# Patient Record
Sex: FEMALE | Race: WHITE | Employment: STUDENT | URBAN - METROPOLITAN AREA
[De-identification: names, ages, dates, MRNs, and addresses within clinical notes are randomized per-mention and may not be internally consistent; named-entity substitution may affect disease eponyms.]

---

## 2017-10-19 ENCOUNTER — HOSPITAL ENCOUNTER (INPATIENT)
Age: 22
LOS: 1 days | Discharge: HOME | DRG: 639 | End: 2017-10-20
Attending: EMERGENCY MEDICINE | Admitting: HOSPITALIST
Payer: OTHER GOVERNMENT

## 2017-10-19 ENCOUNTER — APPOINTMENT (OUTPATIENT)
Dept: GENERAL RADIOLOGY | Age: 22
DRG: 639 | End: 2017-10-19
Attending: NURSE PRACTITIONER
Payer: OTHER GOVERNMENT

## 2017-10-19 DIAGNOSIS — E08.10 DIABETIC KETOACIDOSIS WITHOUT COMA ASSOCIATED WITH DIABETES MELLITUS DUE TO UNDERLYING CONDITION (HCC): Primary | ICD-10-CM

## 2017-10-19 PROBLEM — E11.10 DKA (DIABETIC KETOACIDOSES): Status: ACTIVE | Noted: 2017-10-19

## 2017-10-19 LAB
ADMINISTERED INITIALS, ADMINIT: NORMAL
ALBUMIN SERPL-MCNC: 3.9 G/DL (ref 3.5–5)
ALBUMIN/GLOB SERPL: 0.8 {RATIO} (ref 1.1–2.2)
ALP SERPL-CCNC: 149 U/L (ref 45–117)
ALT SERPL-CCNC: 19 U/L (ref 12–78)
ANION GAP BLD CALC-SCNC: 20 MMOL/L (ref 5–15)
ANION GAP SERPL CALC-SCNC: 16 MMOL/L (ref 5–15)
ANION GAP SERPL CALC-SCNC: 19 MMOL/L (ref 5–15)
ANION GAP SERPL CALC-SCNC: ABNORMAL MMOL/L (ref 5–15)
APPEARANCE UR: ABNORMAL
ARTERIAL PATENCY WRIST A: ABNORMAL
ARTERIAL PATENCY WRIST A: YES
AST SERPL-CCNC: 11 U/L (ref 15–37)
BACTERIA URNS QL MICRO: ABNORMAL /HPF
BASOPHILS # BLD: 0 K/UL (ref 0–0.1)
BASOPHILS NFR BLD: 0 % (ref 0–1)
BDY SITE: ABNORMAL
BDY SITE: ABNORMAL
BILIRUB SERPL-MCNC: 0.4 MG/DL (ref 0.2–1)
BILIRUB UR QL: NEGATIVE
BLASTS NFR BLD MANUAL: 0 %
BUN BLD-MCNC: 14 MG/DL (ref 9–20)
BUN SERPL-MCNC: 10 MG/DL (ref 6–20)
BUN/CREAT SERPL: 10 (ref 12–20)
BUN/CREAT SERPL: 10 (ref 12–20)
BUN/CREAT SERPL: 8 (ref 12–20)
CA-I BLD-MCNC: 1.28 MMOL/L (ref 1.12–1.32)
CALCIUM SERPL-MCNC: 7.9 MG/DL (ref 8.5–10.1)
CALCIUM SERPL-MCNC: 8 MG/DL (ref 8.5–10.1)
CALCIUM SERPL-MCNC: 8.2 MG/DL (ref 8.5–10.1)
CHLORIDE BLD-SCNC: 115 MMOL/L (ref 98–107)
CHLORIDE SERPL-SCNC: 108 MMOL/L (ref 97–108)
CHLORIDE SERPL-SCNC: 112 MMOL/L (ref 97–108)
CHLORIDE SERPL-SCNC: 113 MMOL/L (ref 97–108)
CO2 BLD-SCNC: 8 MMOL/L (ref 21–32)
CO2 SERPL-SCNC: 5 MMOL/L (ref 21–32)
CO2 SERPL-SCNC: 9 MMOL/L (ref 21–32)
CO2 SERPL-SCNC: <5 MMOL/L (ref 21–32)
COLOR UR: ABNORMAL
CREAT BLD-MCNC: 0.6 MG/DL (ref 0.6–1.3)
CREAT SERPL-MCNC: 1 MG/DL (ref 0.55–1.02)
CREAT SERPL-MCNC: 1.05 MG/DL (ref 0.55–1.02)
CREAT SERPL-MCNC: 1.19 MG/DL (ref 0.55–1.02)
D DIMER PPP FEU-MCNC: 1.91 MG/L FEU (ref 0–0.65)
D50 ADMINISTERED, D50ADM: 0 ML
D50 ORDER, D50ORD: 0 ML
DIFFERENTIAL METHOD BLD: ABNORMAL
EOSINOPHIL # BLD: 0 K/UL (ref 0–0.4)
EOSINOPHIL NFR BLD: 0 % (ref 0–7)
EPITH CASTS URNS QL MICRO: ABNORMAL /LPF
ERYTHROCYTE [DISTWIDTH] IN BLOOD BY AUTOMATED COUNT: 14.7 % (ref 11.5–14.5)
EST. AVERAGE GLUCOSE BLD GHB EST-MCNC: 312 MG/DL
GAS FLOW.O2 O2 DELIVERY SYS: ABNORMAL L/MIN
GAS FLOW.O2 O2 DELIVERY SYS: ABNORMAL L/MIN
GLOBULIN SER CALC-MCNC: 4.7 G/DL (ref 2–4)
GLSCOM COMMENTS: NORMAL
GLUCOSE BLD STRIP.AUTO-MCNC: 177 MG/DL (ref 65–100)
GLUCOSE BLD STRIP.AUTO-MCNC: 184 MG/DL (ref 65–100)
GLUCOSE BLD STRIP.AUTO-MCNC: 186 MG/DL (ref 65–100)
GLUCOSE BLD STRIP.AUTO-MCNC: 193 MG/DL (ref 65–100)
GLUCOSE BLD STRIP.AUTO-MCNC: 194 MG/DL (ref 65–100)
GLUCOSE BLD STRIP.AUTO-MCNC: 237 MG/DL (ref 65–100)
GLUCOSE BLD STRIP.AUTO-MCNC: 269 MG/DL (ref 65–100)
GLUCOSE BLD STRIP.AUTO-MCNC: 277 MG/DL (ref 65–100)
GLUCOSE BLD STRIP.AUTO-MCNC: 286 MG/DL (ref 65–100)
GLUCOSE BLD-MCNC: 258 MG/DL (ref 65–100)
GLUCOSE SERPL-MCNC: 202 MG/DL (ref 65–100)
GLUCOSE SERPL-MCNC: 283 MG/DL (ref 65–100)
GLUCOSE SERPL-MCNC: 293 MG/DL (ref 65–100)
GLUCOSE UR STRIP.AUTO-MCNC: >1000 MG/DL
GLUCOSE, GLC: 177 MG/DL
GLUCOSE, GLC: 184 MG/DL
GLUCOSE, GLC: 186 MG/DL
GLUCOSE, GLC: 193 MG/DL
GLUCOSE, GLC: 194 MG/DL
GLUCOSE, GLC: 237 MG/DL
GLUCOSE, GLC: 277 MG/DL
GLUCOSE, GLC: 286 MG/DL
GRAN CASTS URNS QL MICRO: ABNORMAL /LPF
HBA1C MFR BLD: 12.5 % (ref 4.2–6.3)
HCG UR QL: NEGATIVE
HCT VFR BLD AUTO: 47.5 % (ref 35–47)
HCT VFR BLD CALC: 49 % (ref 35–47)
HGB BLD-MCNC: 16.7 GM/DL (ref 11.5–16)
HGB BLD-MCNC: 17 G/DL (ref 11.5–16)
HGB UR QL STRIP: ABNORMAL
HIGH TARGET, HITG: 250 MG/DL
INSULIN ADMINSTERED, INSADM: 2.3 UNITS/HOUR
INSULIN ADMINSTERED, INSADM: 2.5 UNITS/HOUR
INSULIN ADMINSTERED, INSADM: 2.7 UNITS/HOUR
INSULIN ADMINSTERED, INSADM: 2.7 UNITS/HOUR
INSULIN ADMINSTERED, INSADM: 5 UNITS/HOUR
INSULIN ADMINSTERED, INSADM: 6.8 UNITS/HOUR
INSULIN ADMINSTERED, INSADM: 7.1 UNITS/HOUR
INSULIN ADMINSTERED, INSADM: 8.7 UNITS/HOUR
INSULIN ORDER, INSORD: 2.3 UNITS/HOUR
INSULIN ORDER, INSORD: 2.5 UNITS/HOUR
INSULIN ORDER, INSORD: 2.7 UNITS/HOUR
INSULIN ORDER, INSORD: 2.7 UNITS/HOUR
INSULIN ORDER, INSORD: 5 UNITS/HOUR
INSULIN ORDER, INSORD: 6.8 UNITS/HOUR
INSULIN ORDER, INSORD: 7.1 UNITS/HOUR
INSULIN ORDER, INSORD: 8.7 UNITS/HOUR
KETONES UR QL STRIP.AUTO: >80 MG/DL
LEUKOCYTE ESTERASE UR QL STRIP.AUTO: ABNORMAL
LOW TARGET, LOT: 150 MG/DL
LYMPHOCYTES # BLD: 2 K/UL (ref 0.8–3.5)
LYMPHOCYTES NFR BLD: 14 % (ref 12–49)
MAGNESIUM SERPL-MCNC: 1.5 MG/DL (ref 1.6–2.4)
MAGNESIUM SERPL-MCNC: 1.7 MG/DL (ref 1.6–2.4)
MANUAL DIFFERENTIAL PERFORMED BLD QL: ABNORMAL
MCH RBC QN AUTO: 29.5 PG (ref 26–34)
MCHC RBC AUTO-ENTMCNC: 35.8 G/DL (ref 30–36.5)
MCV RBC AUTO: 82.3 FL (ref 80–99)
METAMYELOCYTES NFR BLD MANUAL: 4 %
MINUTES UNTIL NEXT BG, NBG: 60 MIN
MONOCYTES # BLD: 1.1 K/UL (ref 0–1)
MONOCYTES NFR BLD: 8 % (ref 5–13)
MULTIPLIER, MUL: 0.02
MULTIPLIER, MUL: 0.03
MULTIPLIER, MUL: 0.04
MYELOCYTES NFR BLD MANUAL: 0 %
NEUTS BAND NFR BLD MANUAL: 1 % (ref 0–6)
NEUTS SEG # BLD: 10.6 K/UL (ref 1.8–8)
NEUTS SEG NFR BLD: 73 % (ref 32–75)
NITRITE UR QL STRIP.AUTO: NEGATIVE
NRBC # BLD: 0 K/UL (ref 0–0.01)
NRBC BLD-RTO: 0 PER 100 WBC
ORDER INITIALS, ORDINIT: NORMAL
OTHER CELLS NFR BLD MANUAL: 0 %
PCO2 BLD: <17 MMHG (ref 35–45)
PCO2 BLDV: <17 MMHG (ref 41–51)
PH BLD: 6.96 [PH] (ref 7.35–7.45)
PH BLDV: 6.91 [PH] (ref 7.32–7.42)
PH UR STRIP: 5.5 [PH] (ref 5–8)
PHOSPHATE SERPL-MCNC: 1.5 MG/DL (ref 2.6–4.7)
PLATELET # BLD AUTO: 256 K/UL (ref 150–400)
PO2 BLD: 127 MMHG (ref 80–100)
PO2 BLDV: 43 MMHG (ref 25–40)
POTASSIUM BLD-SCNC: 3.8 MMOL/L (ref 3.5–5.1)
POTASSIUM SERPL-SCNC: 2.9 MMOL/L (ref 3.5–5.1)
POTASSIUM SERPL-SCNC: 3.1 MMOL/L (ref 3.5–5.1)
POTASSIUM SERPL-SCNC: 3.2 MMOL/L (ref 3.5–5.1)
PROMYELOCYTES NFR BLD MANUAL: 0 %
PROT SERPL-MCNC: 8.6 G/DL (ref 6.4–8.2)
PROT UR STRIP-MCNC: 100 MG/DL
RBC # BLD AUTO: 5.77 M/UL (ref 3.8–5.2)
RBC #/AREA URNS HPF: ABNORMAL /HPF (ref 0–5)
RBC MORPH BLD: ABNORMAL
SERVICE CMNT-IMP: ABNORMAL
SODIUM BLD-SCNC: 138 MMOL/L (ref 136–145)
SODIUM SERPL-SCNC: 134 MMOL/L (ref 136–145)
SODIUM SERPL-SCNC: 137 MMOL/L (ref 136–145)
SODIUM SERPL-SCNC: 137 MMOL/L (ref 136–145)
SP GR UR REFRACTOMETRY: 1.02 (ref 1–1.03)
SPECIMEN TYPE: ABNORMAL
SPECIMEN TYPE: ABNORMAL
TOTAL RESP. RATE, ITRR: 27
TROPONIN I SERPL-MCNC: <0.04 NG/ML
UR CULT HOLD, URHOLD: NORMAL
UROBILINOGEN UR QL STRIP.AUTO: 0.2 EU/DL (ref 0.2–1)
WBC # BLD AUTO: 14.3 K/UL (ref 3.6–11)
WBC URNS QL MICRO: ABNORMAL /HPF (ref 0–4)

## 2017-10-19 PROCEDURE — 74011250636 HC RX REV CODE- 250/636: Performed by: FAMILY MEDICINE

## 2017-10-19 PROCEDURE — 36600 WITHDRAWAL OF ARTERIAL BLOOD: CPT

## 2017-10-19 PROCEDURE — 74011000250 HC RX REV CODE- 250: Performed by: HOSPITALIST

## 2017-10-19 PROCEDURE — 84100 ASSAY OF PHOSPHORUS: CPT | Performed by: HOSPITALIST

## 2017-10-19 PROCEDURE — 96361 HYDRATE IV INFUSION ADD-ON: CPT

## 2017-10-19 PROCEDURE — 74011250637 HC RX REV CODE- 250/637: Performed by: HOSPITALIST

## 2017-10-19 PROCEDURE — 83036 HEMOGLOBIN GLYCOSYLATED A1C: CPT | Performed by: NURSE PRACTITIONER

## 2017-10-19 PROCEDURE — 83735 ASSAY OF MAGNESIUM: CPT | Performed by: HOSPITALIST

## 2017-10-19 PROCEDURE — 84484 ASSAY OF TROPONIN QUANT: CPT | Performed by: NURSE PRACTITIONER

## 2017-10-19 PROCEDURE — 82803 BLOOD GASES ANY COMBINATION: CPT

## 2017-10-19 PROCEDURE — 80048 BASIC METABOLIC PNL TOTAL CA: CPT | Performed by: HOSPITALIST

## 2017-10-19 PROCEDURE — 74011000258 HC RX REV CODE- 258: Performed by: HOSPITALIST

## 2017-10-19 PROCEDURE — 74011250636 HC RX REV CODE- 250/636: Performed by: NURSE PRACTITIONER

## 2017-10-19 PROCEDURE — 74011636637 HC RX REV CODE- 636/637: Performed by: HOSPITALIST

## 2017-10-19 PROCEDURE — 36415 COLL VENOUS BLD VENIPUNCTURE: CPT | Performed by: HOSPITALIST

## 2017-10-19 PROCEDURE — 85379 FIBRIN DEGRADATION QUANT: CPT | Performed by: NURSE PRACTITIONER

## 2017-10-19 PROCEDURE — 65610000006 HC RM INTENSIVE CARE

## 2017-10-19 PROCEDURE — 82962 GLUCOSE BLOOD TEST: CPT

## 2017-10-19 PROCEDURE — 80053 COMPREHEN METABOLIC PANEL: CPT | Performed by: NURSE PRACTITIONER

## 2017-10-19 PROCEDURE — 81025 URINE PREGNANCY TEST: CPT

## 2017-10-19 PROCEDURE — 80047 BASIC METABLC PNL IONIZED CA: CPT

## 2017-10-19 PROCEDURE — 74011250637 HC RX REV CODE- 250/637: Performed by: FAMILY MEDICINE

## 2017-10-19 PROCEDURE — 99285 EMERGENCY DEPT VISIT HI MDM: CPT

## 2017-10-19 PROCEDURE — 81001 URINALYSIS AUTO W/SCOPE: CPT | Performed by: NURSE PRACTITIONER

## 2017-10-19 PROCEDURE — 71020 XR CHEST PA LAT: CPT

## 2017-10-19 PROCEDURE — 96374 THER/PROPH/DIAG INJ IV PUSH: CPT

## 2017-10-19 PROCEDURE — 74011250636 HC RX REV CODE- 250/636: Performed by: HOSPITALIST

## 2017-10-19 PROCEDURE — 85027 COMPLETE CBC AUTOMATED: CPT | Performed by: NURSE PRACTITIONER

## 2017-10-19 RX ORDER — POTASSIUM CHLORIDE 7.45 MG/ML
10 INJECTION INTRAVENOUS
Status: COMPLETED | OUTPATIENT
Start: 2017-10-19 | End: 2017-10-20

## 2017-10-19 RX ORDER — ONDANSETRON 2 MG/ML
4 INJECTION INTRAMUSCULAR; INTRAVENOUS
Status: DISCONTINUED | OUTPATIENT
Start: 2017-10-19 | End: 2017-10-20 | Stop reason: HOSPADM

## 2017-10-19 RX ORDER — INSULIN ASPART 100 [IU]/ML
INJECTION, SOLUTION INTRAVENOUS; SUBCUTANEOUS
COMMUNITY
End: 2021-02-08 | Stop reason: SDUPTHER

## 2017-10-19 RX ORDER — SODIUM CHLORIDE 0.9 % (FLUSH) 0.9 %
5-10 SYRINGE (ML) INJECTION AS NEEDED
Status: DISCONTINUED | OUTPATIENT
Start: 2017-10-19 | End: 2017-10-20 | Stop reason: HOSPADM

## 2017-10-19 RX ORDER — MAGNESIUM SULFATE 100 %
4 CRYSTALS MISCELLANEOUS AS NEEDED
Status: DISCONTINUED | OUTPATIENT
Start: 2017-10-19 | End: 2017-10-20 | Stop reason: HOSPADM

## 2017-10-19 RX ORDER — DEXTROSE 50 % IN WATER (D50W) INTRAVENOUS SYRINGE
12.5-25 AS NEEDED
Status: DISCONTINUED | OUTPATIENT
Start: 2017-10-19 | End: 2017-10-20 | Stop reason: HOSPADM

## 2017-10-19 RX ORDER — MAGNESIUM SULFATE HEPTAHYDRATE 40 MG/ML
2 INJECTION, SOLUTION INTRAVENOUS ONCE
Status: COMPLETED | OUTPATIENT
Start: 2017-10-19 | End: 2017-10-19

## 2017-10-19 RX ORDER — KETOROLAC TROMETHAMINE 30 MG/ML
30 INJECTION, SOLUTION INTRAMUSCULAR; INTRAVENOUS
Status: COMPLETED | OUTPATIENT
Start: 2017-10-19 | End: 2017-10-19

## 2017-10-19 RX ORDER — INSULIN GLARGINE 100 [IU]/ML
40 INJECTION, SOLUTION SUBCUTANEOUS
COMMUNITY
End: 2021-02-08 | Stop reason: SDUPTHER

## 2017-10-19 RX ORDER — SODIUM CHLORIDE 0.9 % (FLUSH) 0.9 %
5-10 SYRINGE (ML) INJECTION EVERY 8 HOURS
Status: DISCONTINUED | OUTPATIENT
Start: 2017-10-19 | End: 2017-10-20 | Stop reason: HOSPADM

## 2017-10-19 RX ORDER — ACETAMINOPHEN 325 MG/1
650 TABLET ORAL
Status: DISCONTINUED | OUTPATIENT
Start: 2017-10-19 | End: 2017-10-20 | Stop reason: HOSPADM

## 2017-10-19 RX ORDER — POTASSIUM CHLORIDE 750 MG/1
40 TABLET, FILM COATED, EXTENDED RELEASE ORAL
Status: COMPLETED | OUTPATIENT
Start: 2017-10-19 | End: 2017-10-19

## 2017-10-19 RX ORDER — INSULIN LISPRO 100 [IU]/ML
INJECTION, SOLUTION INTRAVENOUS; SUBCUTANEOUS
Status: DISCONTINUED | OUTPATIENT
Start: 2017-10-19 | End: 2017-10-20

## 2017-10-19 RX ADMIN — Medication 10 ML: at 15:00

## 2017-10-19 RX ADMIN — SODIUM CHLORIDE 2.7 UNITS/HR: 900 INJECTION, SOLUTION INTRAVENOUS at 15:23

## 2017-10-19 RX ADMIN — POTASSIUM CHLORIDE 40 MEQ: 750 TABLET, FILM COATED, EXTENDED RELEASE ORAL at 16:41

## 2017-10-19 RX ADMIN — MAGNESIUM SULFATE HEPTAHYDRATE 2 G: 40 INJECTION, SOLUTION INTRAVENOUS at 22:18

## 2017-10-19 RX ADMIN — ACETAMINOPHEN 650 MG: 325 TABLET, FILM COATED ORAL at 20:52

## 2017-10-19 RX ADMIN — KETOROLAC TROMETHAMINE 30 MG: 30 INJECTION, SOLUTION INTRAMUSCULAR at 14:05

## 2017-10-19 RX ADMIN — SODIUM CHLORIDE 1000 ML: 900 INJECTION, SOLUTION INTRAVENOUS at 12:49

## 2017-10-19 RX ADMIN — POTASSIUM CHLORIDE: 2 INJECTION, SOLUTION, CONCENTRATE INTRAVENOUS at 23:59

## 2017-10-19 RX ADMIN — POTASSIUM CHLORIDE 10 MEQ: 10 INJECTION, SOLUTION INTRAVENOUS at 23:23

## 2017-10-19 RX ADMIN — POTASSIUM CHLORIDE: 2 INJECTION, SOLUTION, CONCENTRATE INTRAVENOUS at 16:33

## 2017-10-19 RX ADMIN — Medication 10 ML: at 22:27

## 2017-10-19 NOTE — DIABETES MGMT
DTC Insulin Drip   Progress Note     Recommendations/ Comments:  Patient on the Insulin drip for < 1 hour. Drip rate 2.7 units/hr. Consult received, DTC will see patient tomorrow. Patient will require basal insulin 2 hours prior to discontinuing the drip. May want to start with 75% of home dose due to patient not taking as prescribed. Chart reviewed on [facility]. Patient is 22 y.o. female with a history of Type 1 Diabetes on intensive insulin injection program (Lantus 52 units and Humalog 10-16 units before meals) at home. A1c:   Lab Results   Component Value Date/Time    Hemoglobin A1c 12.5 10/19/2017 11:58 AM         Recent Glucose Results:   Lab Results   Component Value Date/Time     (H) 10/19/2017 11:58 AM    GLUCPOC 194 (H) 10/19/2017 03:20 PM    GLUCPOC 258 (H) 10/19/2017 12:35 PM    GLUCPOC 269 (H) 10/19/2017 11:50 AM        Lab Results   Component Value Date/Time    Creatinine 1.19 10/19/2017 11:58 AM       Active Orders   Diet    DIET CLEAR LIQUID        PO intake: No data found. Currently on insulin gtt. Will continue to follow as needed. Thank you.   Champ Diaz, MS, RN, CDE

## 2017-10-19 NOTE — ED PROVIDER NOTES
HPI Comments: 23 y/o female with IDDM here with sob and hyperglycemia. She has been feeling sob for the past 5 days. She denies any chest pain or tightness. Her blood sugars have been high for her this past week, usually between 200-300s. She did not take her lantus dose last night and admits to forgetting it occasionally. She has had no fever. No v/d; She hasn't had anything to eat or drink today, not sure about intake. She reports being sleep deprived this week secondary to mid terms and staying up late to study. She has a headache since this morning. No st, neck or back pain. No abdominal pain, dizziness, lethargy. Pmh: IDDM, only hospitalized when first diagnosed  Social: vaccines utd; Student at [address] [place] [address] from [place]    Patient is a 25 y.o. female presenting with hyperglycemia. The history is provided by the patient. High Blood Sugar    Associated symptoms include headaches. Past Medical History:   Diagnosis Date    Endocrine disease     type 1 diabetes       History reviewed. No pertinent surgical history. History reviewed. No pertinent family history. Social History     Social History    Marital status: SINGLE     Spouse name: N/A    Number of children: N/A    Years of education: N/A     Occupational History    Not on file. Social History Main Topics    Smoking status: Not on file    Smokeless tobacco: Not on file    Alcohol use Not on file    Drug use: Not on file    Sexual activity: Not on file     Other Topics Concern    Not on file     Social History Narrative    No narrative on file         ALLERGIES: Review of patient's allergies indicates no known allergies. Review of Systems   Constitutional: Negative. HENT: Negative. Respiratory: Positive for shortness of breath. Cardiovascular: Negative. Gastrointestinal: Negative. Musculoskeletal: Negative. Skin: Negative. Neurological: Positive for headaches.    All other systems reviewed and are negative. Vitals:    10/19/17 1142   BP: (!) 107/94   Pulse: (!) 130   Resp: 28   Temp: 97.5 °F (36.4 °C)   SpO2: 100%   Weight: 79.2 kg (174 lb 9.7 oz)            Physical Exam   Constitutional: She is oriented to person, place, and time. She appears well-developed. She appears ill. She appears distressed. HENT:   Mouth/Throat: Mucous membranes are dry. Mm dry, cracked lips   Eyes: Conjunctivae are normal. Pupils are equal, round, and reactive to light. Neck: Normal range of motion. Neck supple. Cardiovascular: Normal pulses. Tachycardia present. Pulmonary/Chest: Breath sounds normal. Accessory muscle usage present. Tachypnea noted. She has no decreased breath sounds. Kussmaul breathing, increased wob on exam; lungs cta   Abdominal: Soft. Bowel sounds are normal. She exhibits no distension. There is no tenderness. Musculoskeletal: Normal range of motion. Neurological: She is alert and oriented to person, place, and time. Skin: Skin is warm and dry. There is pallor. Pale, mottled skin   Nursing note and vitals reviewed.        MDM  Number of Diagnoses or Management Options  Diabetic ketoacidosis without coma associated with diabetes mellitus due to underlying condition [facility]):   Diagnosis management comments: 21 y/o known iddm here with hyperglycemia and sob; + Kussmaul breathing on exam, no cp, tightness; appears ill  Plan-- ivf, labs, possible ct chest to r/o PE       Amount and/or Complexity of Data Reviewed  Clinical lab tests: ordered and reviewed  Tests in the radiology section of CPT®: ordered and reviewed  Obtain history from someone other than the patient: yes  Discuss the patient with other providers: yes (Adult hospitalist)    Risk of Complications, Morbidity, and/or Mortality  Presenting problems: high  Diagnostic procedures: moderate  Management options: moderate    Patient Progress  Patient progress: stable    ED Course       Procedures                       12:40: Hospitalist consult: I spoke with Dr. Farrukh Valdez about patient's h and p, vbg and chem panel results; He would like a call back when cbc and other labs come back and will admit patient. Dr. Mims Back came by to see patient, I informed him of patient's d dimer result and concern for PE and I ordered cta chest. He feels she does not need the cts chest at this time and canceled test. Patient will be admitted to adult ICU.

## 2017-10-19 NOTE — PROGRESS NOTES
Intensivist / Pulmonary / Critical Care  Assessment / Plan:    23 yo with Type 1 DM admitted to ICU for treatment of DKA    1. DKA - poor baseline Diabetes control with Hgb A1C > 12. Severe metabolic acidosis with pH 6.9 and hypokalemia  2. Hypokalemia in setting of severe acidosis and DKA is concerning  3. Volume depletion secondary to 1  4. RADHA    --IVF (has been started on bicarb and K containing fluids  --insulin gtt protocol with frequent check of electrolytes  --will need aggressive K replacement  --check for Mg depletion as well  --DTC eval  --Management per hospitalist team    Will be available to see formally if needed      History / Subjective:  Hospital Day: 1 - Interval history and notes reviewed     23 yo with type 1 DM presented with sob for 5 days. BS have been in the 200-300's per patient and had missed some insulin. Chemistries / urinalysis and abg consistent with DKA    Objective:  Patient Vitals for the past 4 hrs:   BP Temp Pulse Resp SpO2 Weight   10/19/17 1530 125/84 - 99 17 98 % -   10/19/17 1515 133/87 - (!) 102 16 100 % -   10/19/17 1500 126/81 - (!) 101 15 100 % -   10/19/17 1455 117/83 98 °F (36.7 °C) (!) 108 18 100 % 80 kg (176 lb 5.9 oz)   10/19/17 1400 - - (!) 101 22 99 % -   10/19/17 1315 - - (!) 111 21 99 % -   10/19/17 1230 - - (!) 118 19 100 % -   10/19/17 1200 (!) 125/99 - (!) 115 20 100 % -   Temp (24hrs), Av.8 °F (36.6 °C), Min:97.5 °F (36.4 °C), Max:98 °F (36.7 °C)  No intake or output data in the 24 hours ending 10/19/17 1556  Lab Results   Component Value Date/Time    Glucose (POC) 194 10/19/2017 03:20 PM    Glucose (POC) 258 10/19/2017 12:35 PM    Glucose (POC) 269 10/19/2017 11:50 AM     Exam:  MM dry  No accessory use  Moves all extremities    Data:   Interval lab and diagnostic data was reviewed. Interval radiology images were independently viewed and available reports were reviewed.      CXR - clear    Lab:  Recent Labs      10/19/17   1158   WBC  14.3*   HGB 17.0*   PLT  256   NA  134*   K  3.2*   CL  108   CO2  <5*   BUN  10   CREA  1.19*   GLU  283*   CA  8.2*   TROIQ  <0.04   TBILI  0.4   SGOT  11*     ABG:  Recent Labs      10/19/17   1437   PHI  6.963*   PCO2I  <17.0*   PO2I  127*          Leo Powers MD

## 2017-10-19 NOTE — H&P
[address]   [address], [address]   HISTORY AND PHYSICAL       Name:  Lesly Coleman   MR#:  [id number]   :  1995   Account #:  [de-identified]        Date of Adm:  10/19/2017       ADMITTING ATTENDING: Key Gan MD.    PRIMARY CARE PHYSICIAN: Dr. Sienna Blair. PRIMARY ENDOCRINOLOGIST: Dr. Jad Dixon. CHIEF COMPLAINT: Shortness of breath. HISTORY OF PRESENT ILLNESS: This is a 52-year-old female with a   past medical history of diabetes mellitus type 1 that she was diagnosed   at the age of 13, 5 years ago. She comes over here because of   shortness of breath that started last , that is about 4 days ago. She claims that before she was perfectly at her baseline. She    started having some shortness of breath which progressively got worse   to the point that she claims that she would have shortness of breath   even with minimal exertion and she claimed that it would also get   worse on lying down. No chest pain. No nausea, vomiting. No nasal   congestion, no diarrhea. No urinary symptoms. She claims that she   does have a headache as well as dizziness. On specifically asking   about headaches, she mentioned that last night she was having \"a   pounding headache,\" but at the time that I was talking to her, she was   having only a mild headache. On specifically asking, the patient admits that since last few days she   has not been taking her Lantus as she is supposed to take (at home   patient is supposed to take Lantus 52 units along with NovoLog based   on her blood sugars). She claims that the reason she was not taking   her medications regularly is because she is studying till late and   sometimes she forgets taking her insulin. REVIEW OF SYSTEMS   Pertinent positives as above. The rest of the review of system were   reviewed but were all negative except for above. PAST MEDICAL HISTORY: Diabetes mellitus type 1.     PAST SURGICAL HISTORY: None. SOCIAL HISTORY: Nonsmoker, nonalcoholic, nondrug abuser. ALLERGIES: NO KNOWN DRUG ALLERGIES. HOME MEDICATIONS: The patient is on the following medications:    1. Lantus 52 units subcutaneously at bedtime. 2. NovoLog 10-15 units subcutaneously before breakfast, lunch and   dinner. FAMILY HISTORY: Not significant for coronary artery disease. Her   father has high blood pressure. PHYSICAL EXAMINATION   VITAL SIGNS: At the time when the patient was seen, the patient's   vitals were as follows: Blood pressure 107/94, pulse 130, respiratory   rate 28, saturating 100% on room air. GENERAL: Not in distress, comfortably sitting on bed. HEAD: Normocephalic, atraumatic. EYES: PERRLA, EOMI. EARS: Bilateral hearing normal, no growth. NOSE: No polyps, no bleeding. MOUTH: Dry mucous membranes. Decent oral hygiene. NECK: Supple, no JVD, no thyromegaly. RESPIRATORY: Clear to auscultation bilaterally. No adventitious   breath sounds. CARDIOVASCULAR: S1, S2 normal. No murmurs, rubs, or gallops. GASTROINTESTINAL: Bowel sounds present. Soft, nontender,   nondistended. NEUROLOGICAL: Cranial nerves 2 through 12 intact. Motor 5/5   bilaterally, upper limbs and lower limbs. Sensory normal.   PSYCHIATRIC: Mood and affect appropriate. Alert, awake, oriented   x3. LABORATORY AND RADIOLOGICAL RESULTS: WBC 14.3,   hemoglobin 17.0, hematocrit 47, and a platelet count of 126. The   patient's D-dimer is 1.9. Sodium 134, potassium 3.2, chloride 108,   bicarbonate is less than 5, BUN 10, creatinine 1.1. Troponin x1 is   negative. Hemoglobin A1c is 12.5. VBG that was done shows a pH of   6.9, pCO2 less than 17. Chest x-ray does not show any acute   abnormalities.     ASSESSMENT AND PLAN: This is a 70-year-old  female   with a past medical history of diabetes mellitus type 1, who comes over   here because of shortness of breath since last 4 days and is found to   be in diabetic ketoacidosis. 1. Diabetic ketoacidosis in a patient with history of diabetes mellitus   type 1. We will admit the patient in ICU. We will put the patient on   diabetic ketoacidosis protocol. I will also put the patient on D5 half-  normal saline with bicarbonate. We will get serial BMP and based on   that we might have to change the fluids. I wanted to get an ABG on the patient, but currently the patient is   refusing. We might have to get it later. 2. Shortness of breath with tachycardia. I think it is second to diabetic   ketoacidosis. I spoke to the patient as well as the patient's family   members and told them that I doubt that the patient has a PE and we   do not need to get a CTA for now. If even after resolving her DKA, if   she has similar symptoms, then we might readdress that, but I doubt   we would have to do. 3. Leukocytosis. I think it is likely reactive. I do not see any evidence of   any infection anywhere. The patient does not have any urinary   symptoms. Her chest x-ray is fine and her lung examination is fine as   well. I would not put the patient on any antibiotic for now. 4. Hypokalemia. Replace as needed. 5. Metabolic acidosis, likely secondary to diabetic ketoacidosis. Management as above. 6. Probable UTI. The patient does not have UTI    I have spoke to the patient as well as the patient's mom and explained   the plan, they agree with the plan. I spent about 50 minutes in taking care of the patient.         Lucia Cain MD      PG / Mike Manrique   D:  10/19/2017   14:12   T:  10/19/2017   14:58   Job #:  [id number]

## 2017-10-19 NOTE — ED NOTES
TRANSFER - OUT REPORT:    Verbal report given to Pat(name) on Senia Varma  being transferred to ICU 17(unit) for routine progression of care       Report consisted of patients Situation, Background, Assessment and   Recommendations(SBAR). Information from the following report(s) SBAR, ED Summary and MAR was reviewed with the receiving nurse. Lines:   Peripheral IV 10/19/17 Left Hand (Active)       Peripheral IV 10/19/17 Right Antecubital (Active)        Opportunity for questions and clarification was provided.       Patient transported with:   Monitor and nurse

## 2017-10-19 NOTE — IP AVS SNAPSHOT
[address] 
[phone] Patient: Ever Echevarria MRN: [id number] :1995 You are allergic to the following No active allergies Immunizations Administered for This Admission Name Date Influenza Vaccine (Quad) PF  Deferred () Recent Documentation Weight Smoking Status 85.3 kg Never Assessed Emergency Contacts Name Discharge Info Relation Home Work Mobile [address] [facility]  Parent [1] [phone] About your hospitalization You were admitted on:  2017 You last received care in the:  [facility] You were discharged on:  2017 Unit phone number:  [phone] Why you were hospitalized Your primary diagnosis was:  Not on File Your diagnoses also included:  Dka (Diabetic Ketoacidoses) ([facility]) Providers Seen During Your Hospitalizations Provider Role Specialty Primary office phone Rae Contreras MD Attending Provider Emergency Medicine [phone] Ana María Rhodes MD Attending Provider Internal Medicine [phone] Saundra Dolan MD Attending Provider Hospitalist [phone] Your Primary Care Physician (PCP) Primary Care Physician Office Phone Office Fax Mehran Mastersramesh [phone] Follow-up Information Follow up With Details Comments Contact Info Rd Saunders MD Schedule an appointment as soon as possible for a visit in 1 week for hospital discharge follow-up [address] [facility] [address] [facility] 
[phone] Current Discharge Medication List  
  
CONTINUE these medications which have NOT CHANGED Dose & Instructions Dispensing Information Comments Morning Noon Evening Bedtime LANTUS 100 unit/mL injection Generic drug:  insulin glargine Your last dose was: Your next dose is:    
   
   
 Dose:  52 Units 52 Units by SubCUTAneous route nightly. Refills:  0 NovoLOG 100 unit/mL injection Generic drug:  insulin aspart Your last dose was: Your next dose is:    
   
   
 Dose:  10-16 Units 10-16 Units by SubCUTAneous route Before breakfast, lunch, and dinner. Refills:  0 Discharge Instructions Discharge Instructions PATIENT ID: Anu Andrews MRN: [id number] YOB: 1995 DATE OF ADMISSION: 10/19/2017 11:35 AM   
DATE OF DISCHARGE: 10/20/2017 PRIMARY CARE PROVIDER: Armaan Martinez MD  
 
ATTENDING PHYSICIAN: Mely Walter MD 
DISCHARGING PROVIDER: Mely Walter MD   
To contact this individual call [phone] and ask the  to page. If unavailable ask to be transferred the Adult Hospitalist Department. DISCHARGE DIAGNOSES diabetic ketoacidosis (DKA) CONSULTATIONS: None PROCEDURES/SURGERIES: * No surgery found * PENDING TEST RESULTS:  
At the time of discharge the following test results are still pending: n/a FOLLOW UP APPOINTMENTS:  
Follow-up Information Follow up With Details Comments Contact Info Armaan Martinez MD Schedule an appointment as soon as possible for a visit in 1 week for hospital discharge follow-up [address] [facility] 
[phone] ADDITIONAL CARE RECOMMENDATIONS: you were admitted with DKA which has resolved. There is a possibility that this recurs like we discussed. Check your blood sugars frequently and if you note that they are persistently high (>300), call your doctor. If you develop symptoms of DKA such as abdominal pain, nausea and vomiting, trouble breathing, excessive fatigue, then you should return to the hospital for evaluation. Most importantly, you should NEVER skip your insulin.  Resume your home Lantus dose around 11 PM tonight. DIET: Diabetic Diet, no alcohol for the next week ACTIVITY: Activity as tolerated WOUND CARE: n/a 
 
EQUIPMENT needed: n/a DISCHARGE MEDICATIONS: 
 See Medication Reconciliation Form · It is important that you take the medication exactly as they are prescribed. · Keep your medication in the bottles provided by the pharmacist and keep a list of the medication names, dosages, and times to be taken in your wallet. · Do not take other medications without consulting your doctor. NOTIFY YOUR PHYSICIAN FOR ANY OF THE FOLLOWING:  
Fever over 101 degrees for 24 hours. Chest pain, shortness of breath, fever, chills, nausea, vomiting, diarrhea, change in mentation, falling, weakness, bleeding. Severe pain or pain not relieved by medications. Or, any other signs or symptoms that you may have questions about. DISPOSITION: 
x  Home With: 
 OT  PT  [facility]  RN  
  
 SNF/Inpatient Rehab/LTAC Independent/assisted living Hospice Other: CDMP Checked:  
Yes x PROBLEM LIST Updated: 
Yes x Signed:  
Jimy Ring MD 
10/20/2017 
4:46 PM 
 
 
Discharge Orders None [employer] Announcement We are excited to announce that we are making your provider's discharge notes available to you in [employer]. You will see these notes when they are completed and signed by the physician that discharged you from your recent hospital stay. If you have any questions or concerns about any information you see in [employer], please call the Health Information Department where you were seen or reach out to your Primary Care Provider for more information about your plan of care. Introducing [facility]! Keaton Pak introduces [employer] patient portal. Now you can access parts of your medical record, email your doctor's office, and request medication refills online. 1. In your internet browser, go to [email or web address] 2. Click on the First Time User? Click Here link in the Sign In box. You will see the New Member Sign Up page. 3. Enter your [employer] Access Code exactly as it appears below. You will not need to use this code after youve completed the sign-up process. If you do not sign up before the expiration date, you must request a new code. · [employer] Access Code: [email or web address] Expires: 1/18/2018  5:16 PM 
 
4. Enter the last four digits of your Social Security Number (xxxx) and Date of Birth (mm/dd/yyyy) as indicated and click Submit. You will be taken to the next sign-up page. 5. Create a Sweepery ID. This will be your Sweepery login ID and cannot be changed, so think of one that is secure and easy to remember. 6. Create a [employer]y password. You can change your password at any time. 7. Enter your Password Reset Question and Answer. This can be used at a later time if you forget your password. 8. Enter your e-mail address. You will receive e-mail notification when new information is available in [address] 9. Click Sign Up. You can now view and download portions of your medical record. 10. Click the Download Summary menu link to download a portable copy of your medical information. If you have questions, please visit the Frequently Asked Questions section of the [employer] website. Remember, [employer]y is NOT to be used for urgent needs. For medical emergencies, dial 911. Now available from your iPhone and Android! General Information Please provide this summary of care documentation to your next provider. Patient Signature:  ____________________________________________________________ Date:  ____________________________________________________________  
  
[facility] Signature:  ____________________________________________________________ Date:  ____________________________________________________________

## 2017-10-19 NOTE — PROGRESS NOTES
Admission Medication Reconciliation:    Information obtained from: patient     Significant PMH/Disease States:   Past Medical History:   Diagnosis Date    Endocrine disease     type 1 diabetes       Chief Complaint for this Admission:  DKA    Allergies:  Review of patient's allergies indicates no known allergies. Prior to Admission Medications:   Prior to Admission Medications   Prescriptions Last Dose Informant Patient Reported? Taking?   insulin aspart (NOVOLOG) 100 unit/mL injection 10/19/2017 at am  Yes Yes   Sig: 10-16 Units by SubCUTAneous route Before breakfast, lunch, and dinner. insulin glargine (LANTUS) 100 unit/mL injection 10/17/2017 at hs  Yes Yes   Si Units by SubCUTAneous route nightly. Facility-Administered Medications: None         Comments/Recommendations: History updated per patient. She was out of breath, but still able to provide a reliable medication history. No updates needed to inpatient orders.

## 2017-10-19 NOTE — ED TRIAGE NOTES
Arrived via EMS. History of type 1 diabetes. Reports shortness of breath since Sunday. Blood sugars have been elevated between 200 and 300.  by EMS. Denies any vomiting.

## 2017-10-19 NOTE — PROGRESS NOTES
Spoke to Johnella Opitz on phone and heard about the patient. Awaiting for labs including CBC, CMP and ABG before admitting the patient. Johnella Opitz kindly agreed to call me about the lab results.

## 2017-10-20 VITALS
DIASTOLIC BLOOD PRESSURE: 67 MMHG | SYSTOLIC BLOOD PRESSURE: 111 MMHG | OXYGEN SATURATION: 100 % | WEIGHT: 188.05 LBS | TEMPERATURE: 98.3 F | HEART RATE: 108 BPM | RESPIRATION RATE: 17 BRPM

## 2017-10-20 PROBLEM — E11.10 DKA (DIABETIC KETOACIDOSES): Status: RESOLVED | Noted: 2017-10-19 | Resolved: 2017-10-20

## 2017-10-20 LAB
ADMINISTERED INITIALS, ADMINIT: NORMAL
ANION GAP SERPL CALC-SCNC: 12 MMOL/L (ref 5–15)
ANION GAP SERPL CALC-SCNC: 13 MMOL/L (ref 5–15)
ANION GAP SERPL CALC-SCNC: 15 MMOL/L (ref 5–15)
ANION GAP SERPL CALC-SCNC: 9 MMOL/L (ref 5–15)
BASOPHILS # BLD: 0 K/UL (ref 0–0.1)
BASOPHILS NFR BLD: 0 % (ref 0–1)
BUN SERPL-MCNC: 10 MG/DL (ref 6–20)
BUN SERPL-MCNC: 8 MG/DL (ref 6–20)
BUN SERPL-MCNC: 8 MG/DL (ref 6–20)
BUN SERPL-MCNC: 9 MG/DL (ref 6–20)
BUN/CREAT SERPL: 11 (ref 12–20)
BUN/CREAT SERPL: 8 (ref 12–20)
BUN/CREAT SERPL: 8 (ref 12–20)
BUN/CREAT SERPL: 9 (ref 12–20)
CALCIUM SERPL-MCNC: 7.8 MG/DL (ref 8.5–10.1)
CALCIUM SERPL-MCNC: 7.9 MG/DL (ref 8.5–10.1)
CALCIUM SERPL-MCNC: 8.3 MG/DL (ref 8.5–10.1)
CALCIUM SERPL-MCNC: 8.4 MG/DL (ref 8.5–10.1)
CHLORIDE SERPL-SCNC: 111 MMOL/L (ref 97–108)
CHLORIDE SERPL-SCNC: 113 MMOL/L (ref 97–108)
CHLORIDE SERPL-SCNC: 113 MMOL/L (ref 97–108)
CHLORIDE SERPL-SCNC: 115 MMOL/L (ref 97–108)
CO2 SERPL-SCNC: 10 MMOL/L (ref 21–32)
CO2 SERPL-SCNC: 11 MMOL/L (ref 21–32)
CO2 SERPL-SCNC: 15 MMOL/L (ref 21–32)
CO2 SERPL-SCNC: 18 MMOL/L (ref 21–32)
CREAT SERPL-MCNC: 0.89 MG/DL (ref 0.55–1.02)
CREAT SERPL-MCNC: 0.99 MG/DL (ref 0.55–1.02)
CREAT SERPL-MCNC: 1 MG/DL (ref 0.55–1.02)
CREAT SERPL-MCNC: 1.01 MG/DL (ref 0.55–1.02)
D50 ADMINISTERED, D50ADM: 0 ML
D50 ORDER, D50ORD: 0 ML
EOSINOPHIL # BLD: 0 K/UL (ref 0–0.4)
EOSINOPHIL NFR BLD: 0 % (ref 0–7)
ERYTHROCYTE [DISTWIDTH] IN BLOOD BY AUTOMATED COUNT: 14.5 % (ref 11.5–14.5)
EST. AVERAGE GLUCOSE BLD GHB EST-MCNC: 321 MG/DL
GLSCOM COMMENTS: NORMAL
GLUCOSE BLD STRIP.AUTO-MCNC: 138 MG/DL (ref 65–100)
GLUCOSE BLD STRIP.AUTO-MCNC: 139 MG/DL (ref 65–100)
GLUCOSE BLD STRIP.AUTO-MCNC: 143 MG/DL (ref 65–100)
GLUCOSE BLD STRIP.AUTO-MCNC: 154 MG/DL (ref 65–100)
GLUCOSE BLD STRIP.AUTO-MCNC: 164 MG/DL (ref 65–100)
GLUCOSE BLD STRIP.AUTO-MCNC: 168 MG/DL (ref 65–100)
GLUCOSE BLD STRIP.AUTO-MCNC: 179 MG/DL (ref 65–100)
GLUCOSE BLD STRIP.AUTO-MCNC: 205 MG/DL (ref 65–100)
GLUCOSE BLD STRIP.AUTO-MCNC: 217 MG/DL (ref 65–100)
GLUCOSE BLD STRIP.AUTO-MCNC: 221 MG/DL (ref 65–100)
GLUCOSE BLD STRIP.AUTO-MCNC: 239 MG/DL (ref 65–100)
GLUCOSE BLD STRIP.AUTO-MCNC: 264 MG/DL (ref 65–100)
GLUCOSE BLD STRIP.AUTO-MCNC: 282 MG/DL (ref 65–100)
GLUCOSE BLD STRIP.AUTO-MCNC: 341 MG/DL (ref 65–100)
GLUCOSE SERPL-MCNC: 151 MG/DL (ref 65–100)
GLUCOSE SERPL-MCNC: 155 MG/DL (ref 65–100)
GLUCOSE SERPL-MCNC: 181 MG/DL (ref 65–100)
GLUCOSE SERPL-MCNC: 318 MG/DL (ref 65–100)
GLUCOSE, GLC: 138 MG/DL
GLUCOSE, GLC: 139 MG/DL
GLUCOSE, GLC: 143 MG/DL
GLUCOSE, GLC: 154 MG/DL
GLUCOSE, GLC: 164 MG/DL
GLUCOSE, GLC: 168 MG/DL
GLUCOSE, GLC: 179 MG/DL
GLUCOSE, GLC: 205 MG/DL
GLUCOSE, GLC: 217 MG/DL
GLUCOSE, GLC: 221 MG/DL
GLUCOSE, GLC: 239 MG/DL
GLUCOSE, GLC: 264 MG/DL
GLUCOSE, GLC: 282 MG/DL
GLUCOSE, GLC: 341 MG/DL
HBA1C MFR BLD: 12.8 % (ref 4.2–6.3)
HCT VFR BLD AUTO: 37.6 % (ref 35–47)
HGB BLD-MCNC: 13.7 G/DL (ref 11.5–16)
HIGH TARGET, HITG: 250 MG/DL
INSULIN ADMINSTERED, INSADM: 0.8 UNITS/HOUR
INSULIN ADMINSTERED, INSADM: 1 UNITS/HOUR
INSULIN ADMINSTERED, INSADM: 1.2 UNITS/HOUR
INSULIN ADMINSTERED, INSADM: 1.5 UNITS/HOUR
INSULIN ADMINSTERED, INSADM: 1.6 UNITS/HOUR
INSULIN ADMINSTERED, INSADM: 1.7 UNITS/HOUR
INSULIN ADMINSTERED, INSADM: 2.3 UNITS/HOUR
INSULIN ADMINSTERED, INSADM: 3.8 UNITS/HOUR
INSULIN ADMINSTERED, INSADM: 4.1 UNITS/HOUR
INSULIN ADMINSTERED, INSADM: 4.3 UNITS/HOUR
INSULIN ADMINSTERED, INSADM: 5.4 UNITS/HOUR
INSULIN ADMINSTERED, INSADM: 6.4 UNITS/HOUR
INSULIN ADMINSTERED, INSADM: 8.4 UNITS/HOUR
INSULIN ADMINSTERED, INSADM: 8.9 UNITS/HOUR
INSULIN ORDER, INSORD: 0.8 UNITS/HOUR
INSULIN ORDER, INSORD: 1 UNITS/HOUR
INSULIN ORDER, INSORD: 1.2 UNITS/HOUR
INSULIN ORDER, INSORD: 1.5 UNITS/HOUR
INSULIN ORDER, INSORD: 1.6 UNITS/HOUR
INSULIN ORDER, INSORD: 1.7 UNITS/HOUR
INSULIN ORDER, INSORD: 2.3 UNITS/HOUR
INSULIN ORDER, INSORD: 3.8 UNITS/HOUR
INSULIN ORDER, INSORD: 4.1 UNITS/HOUR
INSULIN ORDER, INSORD: 4.3 UNITS/HOUR
INSULIN ORDER, INSORD: 5.4 UNITS/HOUR
INSULIN ORDER, INSORD: 6.4 UNITS/HOUR
INSULIN ORDER, INSORD: 8.4 UNITS/HOUR
INSULIN ORDER, INSORD: 8.9 UNITS/HOUR
LOW TARGET, LOT: 150 MG/DL
LYMPHOCYTES # BLD: 1.1 K/UL (ref 0.8–3.5)
LYMPHOCYTES NFR BLD: 14 % (ref 12–49)
MAGNESIUM SERPL-MCNC: 2 MG/DL (ref 1.6–2.4)
MAGNESIUM SERPL-MCNC: 2.2 MG/DL (ref 1.6–2.4)
MCH RBC QN AUTO: 28.6 PG (ref 26–34)
MCHC RBC AUTO-ENTMCNC: 36.4 G/DL (ref 30–36.5)
MCV RBC AUTO: 78.5 FL (ref 80–99)
MINUTES UNTIL NEXT BG, NBG: 60 MIN
MONOCYTES # BLD: 1 K/UL (ref 0–1)
MONOCYTES NFR BLD: 13 % (ref 5–13)
MULTIPLIER, MUL: 0.01
MULTIPLIER, MUL: 0.02
MULTIPLIER, MUL: 0.02
MULTIPLIER, MUL: 0.03
MULTIPLIER, MUL: 0.04
NEUTS SEG # BLD: 5.7 K/UL (ref 1.8–8)
NEUTS SEG NFR BLD: 73 % (ref 32–75)
ORDER INITIALS, ORDINIT: NORMAL
PLATELET # BLD AUTO: 180 K/UL (ref 150–400)
POTASSIUM SERPL-SCNC: 2.9 MMOL/L (ref 3.5–5.1)
POTASSIUM SERPL-SCNC: 3 MMOL/L (ref 3.5–5.1)
POTASSIUM SERPL-SCNC: 3 MMOL/L (ref 3.5–5.1)
POTASSIUM SERPL-SCNC: 3.4 MMOL/L (ref 3.5–5.1)
RBC # BLD AUTO: 4.79 M/UL (ref 3.8–5.2)
SERVICE CMNT-IMP: ABNORMAL
SODIUM SERPL-SCNC: 138 MMOL/L (ref 136–145)
SODIUM SERPL-SCNC: 138 MMOL/L (ref 136–145)
SODIUM SERPL-SCNC: 139 MMOL/L (ref 136–145)
SODIUM SERPL-SCNC: 140 MMOL/L (ref 136–145)
WBC # BLD AUTO: 7.9 K/UL (ref 3.6–11)

## 2017-10-20 PROCEDURE — 83036 HEMOGLOBIN GLYCOSYLATED A1C: CPT | Performed by: HOSPITALIST

## 2017-10-20 PROCEDURE — 74011000250 HC RX REV CODE- 250: Performed by: HOSPITALIST

## 2017-10-20 PROCEDURE — 80048 BASIC METABOLIC PNL TOTAL CA: CPT | Performed by: HOSPITALIST

## 2017-10-20 PROCEDURE — 74011000258 HC RX REV CODE- 258: Performed by: HOSPITALIST

## 2017-10-20 PROCEDURE — 36415 COLL VENOUS BLD VENIPUNCTURE: CPT | Performed by: HOSPITALIST

## 2017-10-20 PROCEDURE — 85025 COMPLETE CBC W/AUTO DIFF WBC: CPT | Performed by: HOSPITALIST

## 2017-10-20 PROCEDURE — 74011250636 HC RX REV CODE- 250/636: Performed by: HOSPITALIST

## 2017-10-20 PROCEDURE — 83735 ASSAY OF MAGNESIUM: CPT | Performed by: HOSPITALIST

## 2017-10-20 PROCEDURE — 82962 GLUCOSE BLOOD TEST: CPT

## 2017-10-20 PROCEDURE — 83735 ASSAY OF MAGNESIUM: CPT | Performed by: FAMILY MEDICINE

## 2017-10-20 PROCEDURE — 80048 BASIC METABOLIC PNL TOTAL CA: CPT | Performed by: FAMILY MEDICINE

## 2017-10-20 PROCEDURE — 74011250637 HC RX REV CODE- 250/637: Performed by: FAMILY MEDICINE

## 2017-10-20 PROCEDURE — 74011250636 HC RX REV CODE- 250/636: Performed by: FAMILY MEDICINE

## 2017-10-20 PROCEDURE — 74011636637 HC RX REV CODE- 636/637: Performed by: HOSPITALIST

## 2017-10-20 PROCEDURE — 74011250637 HC RX REV CODE- 250/637: Performed by: HOSPITALIST

## 2017-10-20 RX ORDER — INSULIN LISPRO 100 [IU]/ML
INJECTION, SOLUTION INTRAVENOUS; SUBCUTANEOUS
Status: DISCONTINUED | OUTPATIENT
Start: 2017-10-20 | End: 2017-10-20 | Stop reason: HOSPADM

## 2017-10-20 RX ORDER — POTASSIUM CHLORIDE 20MEQ/15ML
40 LIQUID (ML) ORAL
Status: COMPLETED | OUTPATIENT
Start: 2017-10-20 | End: 2017-10-20

## 2017-10-20 RX ORDER — POTASSIUM CHLORIDE 750 MG/1
40 TABLET, FILM COATED, EXTENDED RELEASE ORAL
Status: COMPLETED | OUTPATIENT
Start: 2017-10-20 | End: 2017-10-20

## 2017-10-20 RX ORDER — POTASSIUM CHLORIDE 7.45 MG/ML
10 INJECTION INTRAVENOUS
Status: COMPLETED | OUTPATIENT
Start: 2017-10-20 | End: 2017-10-20

## 2017-10-20 RX ADMIN — POTASSIUM CHLORIDE 10 MEQ: 10 INJECTION, SOLUTION INTRAVENOUS at 01:26

## 2017-10-20 RX ADMIN — SODIUM CHLORIDE 4.1 UNITS/HR: 900 INJECTION, SOLUTION INTRAVENOUS at 10:09

## 2017-10-20 RX ADMIN — POTASSIUM CHLORIDE 10 MEQ: 10 INJECTION, SOLUTION INTRAVENOUS at 02:38

## 2017-10-20 RX ADMIN — POTASSIUM CHLORIDE 10 MEQ: 7.46 INJECTION, SOLUTION INTRAVENOUS at 12:34

## 2017-10-20 RX ADMIN — POTASSIUM CHLORIDE 10 MEQ: 7.46 INJECTION, SOLUTION INTRAVENOUS at 11:35

## 2017-10-20 RX ADMIN — ACETAMINOPHEN 650 MG: 325 TABLET, FILM COATED ORAL at 05:01

## 2017-10-20 RX ADMIN — Medication 10 ML: at 14:00

## 2017-10-20 RX ADMIN — POTASSIUM CHLORIDE: 2 INJECTION, SOLUTION, CONCENTRATE INTRAVENOUS at 07:21

## 2017-10-20 RX ADMIN — POTASSIUM CHLORIDE 10 MEQ: 10 INJECTION, SOLUTION INTRAVENOUS at 00:23

## 2017-10-20 RX ADMIN — Medication 10 ML: at 06:00

## 2017-10-20 RX ADMIN — INSULIN LISPRO 4 UNITS: 100 INJECTION, SOLUTION INTRAVENOUS; SUBCUTANEOUS at 13:10

## 2017-10-20 RX ADMIN — POTASSIUM CHLORIDE 10 MEQ: 7.46 INJECTION, SOLUTION INTRAVENOUS at 09:06

## 2017-10-20 RX ADMIN — POTASSIUM CHLORIDE 40 MEQ: 750 TABLET, FILM COATED, EXTENDED RELEASE ORAL at 17:06

## 2017-10-20 RX ADMIN — HUMAN INSULIN 25 UNITS: 100 INJECTION, SUSPENSION SUBCUTANEOUS at 13:10

## 2017-10-20 RX ADMIN — POTASSIUM CHLORIDE: 2 INJECTION, SOLUTION, CONCENTRATE INTRAVENOUS at 13:42

## 2017-10-20 RX ADMIN — POTASSIUM CHLORIDE 10 MEQ: 7.46 INJECTION, SOLUTION INTRAVENOUS at 07:58

## 2017-10-20 RX ADMIN — SODIUM CHLORIDE 8.4 UNITS/HR: 900 INJECTION, SOLUTION INTRAVENOUS at 11:33

## 2017-10-20 RX ADMIN — POTASSIUM CHLORIDE 40 MEQ: 750 TABLET, FILM COATED, EXTENDED RELEASE ORAL at 06:43

## 2017-10-20 RX ADMIN — POTASSIUM CHLORIDE 40 MEQ: 20 SOLUTION ORAL at 13:10

## 2017-10-20 RX ADMIN — INSULIN LISPRO 3 UNITS: 100 INJECTION, SOLUTION INTRAVENOUS; SUBCUTANEOUS at 09:47

## 2017-10-20 NOTE — PROGRESS NOTES
Problem: Falls - Risk of  Goal: *Absence of Falls  Document Jenn Fall Risk and appropriate interventions in the flowsheet. Outcome: Progressing Towards Goal  Fall Risk Interventions:            Call bell in place. Bed in low position. Patient is to call before getting out of bed. 0730: Student was appropriately supervised during medication administration by preceptor.     Jamila Abdi

## 2017-10-20 NOTE — PROGRESS NOTES
1930: Bedside and Verbal shift change report given to [facility], student nurse & Joe Wagner. (oncoming nurse) by Faby   (offgoing nurse). Report included the following information SBAR, Kardex, ED Summary, Intake/Output, MAR, Recent Results and Cardiac Rhythm Sinus tach     2144: Critical lab result: serum CO2 = 9. MD ritchie. Received orders. Will continue to monitor. 0974: Critical lab result: serum CO2 = 10. Dr. Betito ritchie. Received orders. Will continue to monitor. 0730: Bedside and Verbal shift change report given to Mak Guzman.  (oncoming nurse) by [facility], [address] (offgoing nurse). Report included the following information SBAR, Kardex, ED Summary, Intake/Output, MAR, Recent Results and Cardiac Rhythm Sinus Tach.

## 2017-10-20 NOTE — PROGRESS NOTES
Speech pathology  Consult received for SLP dysphagia screening. Nursing dysphagia screen completed and WNL. Patient put on regular diet/thin liquids. If formal SLP evaluation is desired, please re-consult with SLP eval and treat order as SLP does not complete \"screenings\" only evaluations or treatments. Thanks.     Karolee Severe M.S. CCC-SLP

## 2017-10-20 NOTE — DISCHARGE INSTRUCTIONS
Discharge Instructions       PATIENT ID: Selina Bowen  MRN: [id number]   YOB: 1995    DATE OF ADMISSION: 10/19/2017 11:35 AM    DATE OF DISCHARGE: 10/20/2017    PRIMARY CARE PROVIDER: Scooby Abraham MD     ATTENDING PHYSICIAN: Blanca Conrad MD  DISCHARGING PROVIDER: Blanca Conrad MD    To contact this individual call [phone] and ask the  to page. If unavailable ask to be transferred the Adult Hospitalist Department. DISCHARGE DIAGNOSES diabetic ketoacidosis (DKA)    CONSULTATIONS: None    PROCEDURES/SURGERIES: * No surgery found *    PENDING TEST RESULTS:   At the time of discharge the following test results are still pending: n/a    FOLLOW UP APPOINTMENTS:   Follow-up Information     Follow up With Details Comments Contact Info    Scooby Abraham MD Schedule an appointment as soon as possible for a visit in 1 week for hospital discharge follow-up [address] [facility] [address] [facility] [address]: you were admitted with DKA which has resolved. There is a possibility that this recurs like we discussed. Check your blood sugars frequently and if you note that they are persistently high (>300), call your doctor. If you develop symptoms of DKA such as abdominal pain, nausea and vomiting, trouble breathing, excessive fatigue, then you should return to the hospital for evaluation. Most importantly, you should NEVER skip your insulin. Resume your home Lantus dose around 11 PM tonight. DIET: Diabetic Diet, no alcohol for the next week    ACTIVITY: Activity as tolerated    WOUND CARE: n/a    EQUIPMENT needed: n/a      DISCHARGE MEDICATIONS:   See Medication Reconciliation Form    · It is important that you take the medication exactly as they are prescribed.    · Keep your medication in the bottles provided by the pharmacist and keep a list of the medication names, dosages, and times to be taken in your wallet. · Do not take other medications without consulting your doctor. NOTIFY YOUR PHYSICIAN FOR ANY OF THE FOLLOWING:   Fever over 101 degrees for 24 hours. Chest pain, shortness of breath, fever, chills, nausea, vomiting, diarrhea, change in mentation, falling, weakness, bleeding. Severe pain or pain not relieved by medications. Or, any other signs or symptoms that you may have questions about.       DISPOSITION:  x  Home With:   OT  PT  HH  RN       SNF/Inpatient Rehab/LTAC    Independent/assisted living    Hospice    Other:     CDMP Checked:   Yes x     PROBLEM LIST Updated:  Yes x       Signed:   Daphne Livingston MD  10/20/2017  4:46 PM

## 2017-10-20 NOTE — CDMP QUERY
To accurately capture the SOI & ROM please clarify if this patient is being treated/managed for:    =>Possible UTI (POA) in the setting of abnormal U/A requiring lab monitoring. =>Other Explanation of clinical findings  =>Unable to Determine (no explanation of clinical findings)    The medical record reflects the following clinical findings, treatment, and risk factors:    Risk Factors: Diabetes    Clinical Indicators:   U/A  Leukocyte esterase small  WBC 20-50  1+Bacteria    Treatment: Lab monitoring    Please clarify and document your clinical opinion in the progress notes and discharge summary including the definitive and/or presumptive diagnosis, (suspected or probable), related to the above clinical findings. Please include clinical findings supporting your diagnosis.     Thank you  Marquez Pulido  [facility]  [phone]

## 2017-10-20 NOTE — PROGRESS NOTES
Hospitalist Progress Note      Hospital summary: 22 y.o lady with type I DM who presented with DKA. Assessment/Plan:  DKA: (POA) this was likely precipitated by missing her home sq insulin  -transition to sq insulin once AG is closed  -change her mealtime lispro to match her home carb ratio  -she is eating; will remove dextrose from her fluids  -counseled her on the importance of complete compliance with insulin, not just to avoid DKA but due prevent long-term complications of diabetes    Hypokalemia: improving with repletion; continue the same    Metabolic acidosis due to DKA    Dyspnea: (POA) this is likely due to severe acidosis, resolved. The patient wants to go home ASAP because it's her birthday tomorrow. I explained to her and her mother that she should stay until tomorrow to ensure that DKA doesn't recur on sq insulin and to completely replenish her electrolytes. She is adamant on leaving today. Will re-evaluate in the evening and try to accommodate her as safely as possible. Code status: full  DVT prophylaxis: SCDs  Disposition: home when medically appropriate  ----------------------------------------------    CC: DKA    S: tearful initially during blood draw. Denies abd pain, no fever, dyspnea resolved, no n/v/d, tolerated diet     Review of Systems:  Pertinent items are noted in HPI.     O:  Visit Vitals    BP 92/67    Pulse (!) 105    Temp 98.1 °F (36.7 °C)    Resp 18    Wt 85.3 kg (188 lb 0.8 oz)    LMP 09/25/2017 (Exact Date)    SpO2 96%       PHYSICAL EXAM:  Gen: NAD, non-toxic  HEENT: anicteric sclerae, normal conjunctiva, MM moist  Neck: supple  Heart: RRR, no MRG, no JVD, no peripheral edema  Lungs: CTA b/l, non-labored respirations  Abd: soft, NT, ND, BS+, no organomegaly  Extr: warm  Skin: dry, no rash  Neuro: CN II-XII grossly intact, normal speech, moves all extremities  Psych: anxious and tearful whenever we discuss the possibility of staying another night      Intake/Output Summary (Last 24 hours) at 10/20/17 1158  Last data filed at 10/20/17 0800   Gross per 24 hour   Intake          3315.36 ml   Output                1 ml   Net          3314.36 ml        Recent labs & imaging reviewed:  Recent Labs      10/20/17   0133  10/19/17   1158   WBC  7.9  14.3*   HGB  13.7  17.0*   HCT  37.6  47.5*   PLT  180  256     Recent Labs      10/20/17   0456  10/20/17   0133  10/19/17   2102  10/19/17   1626   NA  138  139  137  137   K  3.0*  2.9*  2.9*  3.1*   CL  115*  113*  112*  113*   CO2  10*  11*  9*  5*   BUN  10  9  10  10   CREA  0.89  1.00  1.05*  1.00   GLU  151*  155*  293*  202*   CA  7.8*  7.9*  7.9*  8.0*   MG  2.0  2.2  1.5*  1.7   PHOS   --    --    --   1.5*     Recent Labs      10/19/17   1158   SGOT  11*   ALT  19   AP  149*   TBILI  0.4   TP  8.6*   ALB  3.9   GLOB  4.7*       Recent Labs      10/19/17   1158   TROIQ  <0.04     No results found for: CHOL, CHOLX, CHLST, CHOLV, HDL, LDL, LDLC, DLDLP, TGLX, TRIGL, TRIGP, CHHD, CHHDX  Lab Results   Component Value Date/Time    Glucose (POC) 341 10/20/2017 11:30 AM    Glucose (POC) 264 10/20/2017 10:08 AM    Glucose (POC) 217 10/20/2017 09:05 AM    Glucose (POC) 205 10/20/2017 07:48 AM    Glucose (POC) 179 10/20/2017 06:45 AM     Lab Results   Component Value Date/Time    Color YELLOW/STRAW 10/19/2017 02:04 PM    Appearance CLOUDY 10/19/2017 02:04 PM    Specific gravity 1.018 10/19/2017 02:04 PM    pH (UA) 5.5 10/19/2017 02:04 PM    Protein 100 10/19/2017 02:04 PM    Glucose >1000 10/19/2017 02:04 PM    Ketone >80 10/19/2017 02:04 PM    Bilirubin NEGATIVE  10/19/2017 02:04 PM    Urobilinogen 0.2 10/19/2017 02:04 PM    Nitrites NEGATIVE  10/19/2017 02:04 PM    Leukocyte Esterase SMALL 10/19/2017 02:04 PM    Epithelial cells FEW 10/19/2017 02:04 PM    Bacteria 1+ 10/19/2017 02:04 PM    WBC 20-50 10/19/2017 02:04 PM    RBC 0-5 10/19/2017 02:04 PM       Med list reviewed  Current Facility-Administered Medications   Medication Dose Route Frequency    potassium chloride 10 mEq in 100 ml IVPB  10 mEq IntraVENous Q1H    0.45% sodium chloride 1,000 mL with potassium chloride 20 mEq, sodium bicarbonate (8.4%) 75 mEq infusion   IntraVENous CONTINUOUS    . PHARMACY TO SUBSTITUTE PER PROTOCOL    Per Protocol    sodium chloride (NS) flush 5-10 mL  5-10 mL IntraVENous Q8H    sodium chloride (NS) flush 5-10 mL  5-10 mL IntraVENous PRN    insulin regular (NOVOLIN R, HUMULIN R) 100 Units in 0.9% sodium chloride 100 mL infusion  0-50 Units/hr IntraVENous TITRATE    glucose chewable tablet 16 g  4 Tab Oral PRN    dextrose (D50W) injection syrg 12.5-25 g  12.5-25 g IntraVENous PRN    glucagon (GLUCAGEN) injection 1 mg  1 mg IntraMUSCular PRN    ondansetron (ZOFRAN) injection 4 mg  4 mg IntraVENous Q4H PRN    acetaminophen (TYLENOL) tablet 650 mg  650 mg Oral Q6H PRN       Care Plan discussed with:  Patient/Family and Nurse mother at the bedside    Kristy Helton MD  Internal Medicine  Date of Service: 10/20/2017

## 2017-10-20 NOTE — CDMP QUERY
Documentation of Acute Renal Thomasnabila Ackreman is noted in the progress notes. Currently the patient does not meet RIFLE criteria (BSV approved) to support this diagnosis. If you are using another criteria to support this diagnosis, please document this in your progress note. Otherwise, please document in the progress notes the clinical indicators that support this diagnosis or state that the diagnosis has been ruled out. RIFLE  (BSV Approved)    RISK:  Increased SCr x 1.5 or GFR decrease > 25% (within 7 days)    INJURY:  Increased SCr x 2.0 or GFR decreased > 50%    FAILURE:  Increased SCr x 3.0 or GFR decrease > 75% or SCr >4.0 mg/dL or acute increase >0.5 mg/dL    LOSS:  Persistent acute renal failure = complete loss of kidney function > 4 weeks    END STAGE:  End stage of kidney disease > 3 months    AKIN  STAGE  1:  Increase in SCr >/= 0.3 mg/dL or >/= 150% to 200% (1.5 to 2-fold) from baseline (within 48 hours)  STAGE  2:  Increase in SCr to more than 200% to 300% (>2-3 fold) from baseline  STAGE  3:  Increase in SCr to more than 300% (>3-fold) from baseline or SCr >/= 4.0 mg/dL with an acute increase of at least 0.5 mg/dL or initiation of renal replacement therapy    KDIGO  STAGE  1:  Increase in SCr by >/= 0.3 mg/dL within 48 hours or increase in SCr 1.5 to 1.9 times baseline which is known or presumed to have occurred within the prior 7 days    STAGE  2:  Increase in SCr to 2.0 to 2.9 times baseline    STAGE  3:  Increase in SCr to 3.0 times baseline or increase in SCr to >/= baseline or increase in SCr to >/= 4.0 mg/dL or initiation of renal replacement therapy    Please clarify and document your clinical opinion in the progress notes and discharge summary including the definitive and/or presumptive diagnosis, (suspected or probable), related to the above clinical findings. Please include clinical findings supporting your diagnosis.     Thank you  Ric Beltran  [facility]  [phone]

## 2017-10-20 NOTE — DISCHARGE SUMMARY
Discharge Summary     Patient: Easton Machado MRN: [id number]  SSN: [id number]    YOB: 1995  Age: 22 y.o. Sex: female       Admit Date: 10/19/2017    Discharge Date: 10/20/2017      Admission Diagnoses: DKA (diabetic ketoacidoses) [facility])    Discharge Diagnoses:   DKA  Hypokalemia  Metabolic acidosis  Shortness of breath     Discharge Condition: Stable    Hospital Course: this is a 22 y.o lady who presented with DKA. She reported missing some of her insulin doses. She was admitted to the ICU and started on an insulin drip, IV fluid resuscitation, and electrolyte repletion. Her anion gap closed the 2nd day of admission and she was transitioned to subcutaneous insulin. It is her birthday tomorrow and she requested to be discharged today. I explained to her that her anion gap may re-open and she may go back into DKA, so we should monitor her for another night. She was adamant on leaving. Discussed the risks at length with her and her mother. She decided to leave and will check her blood glucose frequently at home, and if she has recurrence of her symptoms of DKA, will return to the hospital.    Potassium was replaced prior to discharge for K 3.4. Consults: None    Significant Diagnostic Studies: see above  Discharge labs:  Results for John Haddad (MRN [id number]) as of 10/20/2017 16:46   Ref.  Range 10/20/2017 15:48   Sodium Latest Ref Range: 136 - 145 mmol/L 140   Potassium Latest Ref Range: 3.5 - 5.1 mmol/L 3.4 (L)   Chloride Latest Ref Range: 97 - 108 mmol/L 113 (H)   CO2 Latest Ref Range: 21 - 32 mmol/L 18 (L)   Anion gap Latest Ref Range: 5 - 15 mmol/L 9   Glucose Latest Ref Range: 65 - 100 mg/dL 181 (H)   BUN Latest Ref Range: 6 - 20 MG/DL 8   Creatinine Latest Ref Range: 0.55 - 1.02 MG/DL 0.99   BUN/Creatinine ratio Latest Ref Range: 12 - 20   8 (L)   Calcium Latest Ref Range: 8.5 - 10.1 MG/DL 8.3 (L)   Magnesium Latest Ref Range: 1.6 - 2.4 mg/dL 2.0   GFR est non-AA Latest Ref Range: >60 ml/min/1.73m2 >60   GFR est AA Latest Ref Range: >60 ml/min/1.73m2 >60       Disposition: home    See today's progress note. Discharge Medications:   Current Discharge Medication List      CONTINUE these medications which have NOT CHANGED    Details   insulin aspart (NOVOLOG) 100 unit/mL injection 10-16 Units by SubCUTAneous route Before breakfast, lunch, and dinner. insulin glargine (LANTUS) 100 unit/mL injection 52 Units by SubCUTAneous route nightly.              Follow-up Information     Follow up With Details Comments Contact Info    Vandana Coelho MD Schedule an appointment as soon as possible for a visit in 1 week for hospital discharge follow-up [address] [address]  [phone]            Signed By: Jami Lara MD     October 20, 2017      Greater than 30 minutes spent on patient care, counseling, and discharge management

## 2020-09-30 ENCOUNTER — HOSPITAL ENCOUNTER (EMERGENCY)
Age: 25
Discharge: HOME | End: 2020-10-01
Attending: PEDIATRICS | Admitting: PEDIATRICS
Payer: OTHER GOVERNMENT

## 2020-09-30 ENCOUNTER — APPOINTMENT (OUTPATIENT)
Dept: ULTRASOUND IMAGING | Age: 25
End: 2020-09-30
Attending: PEDIATRICS
Payer: OTHER GOVERNMENT

## 2020-09-30 DIAGNOSIS — K29.00 ACUTE GASTRITIS WITHOUT HEMORRHAGE, UNSPECIFIED GASTRITIS TYPE: Primary | ICD-10-CM

## 2020-09-30 LAB
ALBUMIN SERPL-MCNC: 3.5 G/DL (ref 3.5–5)
ALBUMIN/GLOB SERPL: 1 {RATIO} (ref 1.1–2.2)
ALP SERPL-CCNC: 77 U/L (ref 45–117)
ALT SERPL-CCNC: 24 U/L (ref 12–78)
ANION GAP SERPL CALC-SCNC: 9 MMOL/L (ref 5–15)
APPEARANCE UR: ABNORMAL
AST SERPL-CCNC: 16 U/L (ref 15–37)
BACTERIA URNS QL MICRO: NEGATIVE /HPF
BASOPHILS # BLD: 0.1 K/UL (ref 0–0.1)
BASOPHILS NFR BLD: 1 % (ref 0–1)
BILIRUB SERPL-MCNC: 0.4 MG/DL (ref 0.2–1)
BILIRUB UR QL: NEGATIVE
BUN SERPL-MCNC: 13 MG/DL (ref 6–20)
BUN/CREAT SERPL: 16 (ref 12–20)
CALCIUM SERPL-MCNC: 9.5 MG/DL (ref 8.5–10.1)
CHLORIDE SERPL-SCNC: 106 MMOL/L (ref 97–108)
CO2 SERPL-SCNC: 24 MMOL/L (ref 21–32)
COLOR UR: ABNORMAL
COMMENT, HOLDF: NORMAL
CREAT SERPL-MCNC: 0.79 MG/DL (ref 0.55–1.02)
DIFFERENTIAL METHOD BLD: ABNORMAL
EOSINOPHIL # BLD: 0.1 K/UL (ref 0–0.4)
EOSINOPHIL NFR BLD: 1 % (ref 0–7)
EPITH CASTS URNS QL MICRO: ABNORMAL /LPF
ERYTHROCYTE [DISTWIDTH] IN BLOOD BY AUTOMATED COUNT: 11.2 % (ref 11.5–14.5)
GLOBULIN SER CALC-MCNC: 3.6 G/DL (ref 2–4)
GLUCOSE BLD STRIP.AUTO-MCNC: 114 MG/DL (ref 65–100)
GLUCOSE BLD STRIP.AUTO-MCNC: 74 MG/DL (ref 65–100)
GLUCOSE SERPL-MCNC: 129 MG/DL (ref 65–100)
GLUCOSE UR STRIP.AUTO-MCNC: >1000 MG/DL
HCG UR QL: NEGATIVE
HCT VFR BLD AUTO: 41.3 % (ref 35–47)
HGB BLD-MCNC: 14.7 G/DL (ref 11.5–16)
HGB UR QL STRIP: NEGATIVE
IMM GRANULOCYTES # BLD AUTO: 0 K/UL (ref 0–0.04)
IMM GRANULOCYTES NFR BLD AUTO: 0 % (ref 0–0.5)
KETONES UR QL STRIP.AUTO: NEGATIVE MG/DL
LEUKOCYTE ESTERASE UR QL STRIP.AUTO: ABNORMAL
LIPASE SERPL-CCNC: 80 U/L (ref 73–393)
LYMPHOCYTES # BLD: 2.9 K/UL (ref 0.8–3.5)
LYMPHOCYTES NFR BLD: 28 % (ref 12–49)
MCH RBC QN AUTO: 28.8 PG (ref 26–34)
MCHC RBC AUTO-ENTMCNC: 35.6 G/DL (ref 30–36.5)
MCV RBC AUTO: 81 FL (ref 80–99)
MONOCYTES # BLD: 0.8 K/UL (ref 0–1)
MONOCYTES NFR BLD: 8 % (ref 5–13)
NEUTS SEG # BLD: 6.4 K/UL (ref 1.8–8)
NEUTS SEG NFR BLD: 62 % (ref 32–75)
NITRITE UR QL STRIP.AUTO: NEGATIVE
NRBC # BLD: 0 K/UL (ref 0–0.01)
NRBC BLD-RTO: 0 PER 100 WBC
PH UR STRIP: 6 [PH] (ref 5–8)
PLATELET # BLD AUTO: 267 K/UL (ref 150–400)
PMV BLD AUTO: 9.1 FL (ref 8.9–12.9)
POTASSIUM SERPL-SCNC: 3.4 MMOL/L (ref 3.5–5.1)
PROT SERPL-MCNC: 7.1 G/DL (ref 6.4–8.2)
PROT UR STRIP-MCNC: NEGATIVE MG/DL
RBC # BLD AUTO: 5.1 M/UL (ref 3.8–5.2)
RBC #/AREA URNS HPF: ABNORMAL /HPF (ref 0–5)
SAMPLES BEING HELD,HOLD: NORMAL
SERVICE CMNT-IMP: ABNORMAL
SERVICE CMNT-IMP: NORMAL
SODIUM SERPL-SCNC: 139 MMOL/L (ref 136–145)
SP GR UR REFRACTOMETRY: 1.02 (ref 1–1.03)
UR CULT HOLD, URHOLD: NORMAL
UROBILINOGEN UR QL STRIP.AUTO: 0.2 EU/DL (ref 0.2–1)
WBC # BLD AUTO: 10.3 K/UL (ref 3.6–11)
WBC URNS QL MICRO: ABNORMAL /HPF (ref 0–4)

## 2020-09-30 PROCEDURE — 83690 ASSAY OF LIPASE: CPT

## 2020-09-30 PROCEDURE — 74011250637 HC RX REV CODE- 250/637: Performed by: PEDIATRICS

## 2020-09-30 PROCEDURE — 36415 COLL VENOUS BLD VENIPUNCTURE: CPT

## 2020-09-30 PROCEDURE — 76705 ECHO EXAM OF ABDOMEN: CPT

## 2020-09-30 PROCEDURE — 81001 URINALYSIS AUTO W/SCOPE: CPT

## 2020-09-30 PROCEDURE — 81025 URINE PREGNANCY TEST: CPT

## 2020-09-30 PROCEDURE — 99285 EMERGENCY DEPT VISIT HI MDM: CPT

## 2020-09-30 PROCEDURE — 96374 THER/PROPH/DIAG INJ IV PUSH: CPT

## 2020-09-30 PROCEDURE — 80053 COMPREHEN METABOLIC PANEL: CPT

## 2020-09-30 PROCEDURE — 93005 ELECTROCARDIOGRAM TRACING: CPT

## 2020-09-30 PROCEDURE — 85025 COMPLETE CBC W/AUTO DIFF WBC: CPT

## 2020-09-30 PROCEDURE — 82962 GLUCOSE BLOOD TEST: CPT

## 2020-09-30 PROCEDURE — 74011000250 HC RX REV CODE- 250: Performed by: PEDIATRICS

## 2020-09-30 RX ADMIN — LIDOCAINE HYDROCHLORIDE 40 ML: 20 SOLUTION ORAL; TOPICAL at 22:24

## 2020-09-30 NOTE — LETTER
[address] 
[address] [facility] EMR DEPT 
[address] [place] [address] 
[phone] Work/School Note Date: 9/30/2020 To Whom It May concern: 
 
Dorita Schaefer was seen and treated today in the emergency room by the following provider(s): 
Attending Provider: Gabriella العراقي MD.   
 
Dorita Schaefer Return to work on 10/2/2020.  
Sincerely, 
 
 
 
 
Denzil Kussmaul, RN

## 2020-10-01 VITALS
RESPIRATION RATE: 20 BRPM | TEMPERATURE: 98.9 F | DIASTOLIC BLOOD PRESSURE: 83 MMHG | OXYGEN SATURATION: 98 % | SYSTOLIC BLOOD PRESSURE: 118 MMHG | HEART RATE: 99 BPM

## 2020-10-01 LAB
ATRIAL RATE: 101 BPM
ATRIAL RATE: 89 BPM
CALCULATED P AXIS, ECG09: 44 DEGREES
CALCULATED P AXIS, ECG09: 55 DEGREES
CALCULATED R AXIS, ECG10: 3 DEGREES
CALCULATED R AXIS, ECG10: 6 DEGREES
CALCULATED T AXIS, ECG11: 18 DEGREES
CALCULATED T AXIS, ECG11: 25 DEGREES
DIAGNOSIS, 93000: NORMAL
DIAGNOSIS, 93000: NORMAL
GLUCOSE BLD STRIP.AUTO-MCNC: 117 MG/DL (ref 65–100)
P-R INTERVAL, ECG05: 166 MS
P-R INTERVAL, ECG05: 168 MS
Q-T INTERVAL, ECG07: 366 MS
Q-T INTERVAL, ECG07: 366 MS
QRS DURATION, ECG06: 78 MS
QRS DURATION, ECG06: 84 MS
QTC CALCULATION (BEZET), ECG08: 445 MS
QTC CALCULATION (BEZET), ECG08: 474 MS
SERVICE CMNT-IMP: ABNORMAL
VENTRICULAR RATE, ECG03: 101 BPM
VENTRICULAR RATE, ECG03: 89 BPM

## 2020-10-01 PROCEDURE — C9113 INJ PANTOPRAZOLE SODIUM, VIA: HCPCS | Performed by: PEDIATRICS

## 2020-10-01 PROCEDURE — 74011000250 HC RX REV CODE- 250: Performed by: PEDIATRICS

## 2020-10-01 PROCEDURE — 74011250636 HC RX REV CODE- 250/636: Performed by: PEDIATRICS

## 2020-10-01 RX ORDER — PHENOL/SODIUM PHENOLATE
20 AEROSOL, SPRAY (ML) MUCOUS MEMBRANE DAILY
Qty: 30 TAB | Refills: 0 | Status: SHIPPED | OUTPATIENT
Start: 2020-10-01 | End: 2021-02-08 | Stop reason: ALTCHOICE

## 2020-10-01 RX ORDER — ONDANSETRON 4 MG/1
4 TABLET, ORALLY DISINTEGRATING ORAL
Qty: 10 TAB | Refills: 0 | Status: SHIPPED | OUTPATIENT
Start: 2020-10-01 | End: 2021-05-28 | Stop reason: ALTCHOICE

## 2020-10-01 RX ADMIN — SODIUM CHLORIDE 40 MG: 9 INJECTION INTRAMUSCULAR; INTRAVENOUS; SUBCUTANEOUS at 00:43

## 2020-10-01 NOTE — ED NOTES
Patient rang out to nurse's station stating that her blood sugar felt low. RN at bedside to check suagr. BG down to 74. MD notified. US at bedside to take patient off floor for scan.  Patient given apple juice and instructed to drink after US for gall bladder per MD.

## 2020-10-01 NOTE — ED NOTES
Patient's BG rechecked and is up to 117. MD notified. Patient given peanut butter crackers at this time.

## 2020-10-01 NOTE — ED NOTES
Education: Educated patient on Protonix and Zofran dosage and frequency. Educated patient on following up with GI. Pt. Verbalized understanding.

## 2020-10-01 NOTE — ED NOTES
Patient tolerated PIV placement very well. Blood drawn and sent to lab. Patient educated on time frame for expected lab results. Patient verbalized understanding.

## 2020-10-01 NOTE — ED NOTES
Verbal report given to Olya Chappell RN. Patient resting comfortably on stretcher. Skin warm, dry, and intact. No signs of labored breathing. Patient states she is feeling better after getting the medications and juice. Family remains at bedside. No other needs at this time.

## 2020-10-01 NOTE — ED NOTES
Pt. Resting on stretcher. Pt. Rates pain 2/10 at this time. Pt. Denies any nausea or vomiting. Will continue to monitor.

## 2020-10-01 NOTE — ED TRIAGE NOTES
Patient states for the last three weeks she has had intermittent epigastric pain. She states it gets worse at night. Denies fever, vomiting, or diarrhea.

## 2020-10-01 NOTE — DISCHARGE INSTRUCTIONS
Gastritis: Care Instructions  Your Care Instructions     Gastritis is a sore and upset stomach. It happens when something irritates the stomach lining. Many things can cause it. These include an infection such as the flu or something you ate or drank. Medicines or a sore on the lining of the stomach (ulcer) also can cause it. Your belly may bloat and ache. You may belch, vomit, and feel sick to your stomach. You should be able to relieve the problem by taking medicine. And it may help to change your diet. If gastritis lasts, your doctor may prescribe medicine. Follow-up care is a key part of your treatment and safety. Be sure to make and go to all appointments, and call your doctor if you are having problems. It's also a good idea to know your test results and keep a list of the medicines you take. How can you care for yourself at home? · If your doctor prescribed antibiotics, take them as directed. Do not stop taking them just because you feel better. You need to take the full course of antibiotics. · Be safe with medicines. If your doctor prescribed medicine to decrease stomach acid, take it as directed. Call your doctor if you think you are having a problem with your medicine. · Do not take any other medicine, including over-the-counter pain relievers, without talking to your doctor first.  · If your doctor recommends over-the-counter medicine to reduce stomach acid, such as Pepcid AC (famotidine), Prilosec (omeprazole), or Tagamet HB (cimetidine) follow the directions on the label. · Drink plenty of fluids (enough so that your urine is light yellow or clear like water) to prevent dehydration. Choose water and other caffeine-free clear liquids. If you have kidney, heart, or liver disease and have to limit fluids, talk with your doctor before you increase the amount of fluids you drink. · Limit how much alcohol you drink. · Avoid coffee, tea, cola drinks, chocolate, and other foods with caffeine.  They increase stomach acid. When should you call for help? Call 911 anytime you think you may need emergency care. For example, call if:    · You vomit blood or what looks like coffee grounds.     · You pass maroon or very bloody stools. Call your doctor now or seek immediate medical care if:    · You start breathing fast and have not produced urine in the last 8 hours.     · You cannot keep fluids down. Watch closely for changes in your health, and be sure to contact your doctor if:    · You do not get better as expected. Where can you learn more? Go to http://www.corbin.com/  Enter Z536 in the search box to learn more about \"Gastritis: Care Instructions. \"  Current as of: April 15, 2020               Content Version: 12.6  © 3549-8794 [employer]. Care instructions adapted under license by [employer] (which disclaims liability or warranty for this information). If you have questions about a medical condition or this instruction, always ask your healthcare professional. [address] any warranty or liability for your use of this information.

## 2020-10-01 NOTE — ED PROVIDER NOTES
The history is provided by the patient and a parent. Epigastric Pain    This is a new problem. Episode onset: a few weeks ago. Episode frequency: comes and goes. The problem has not changed (pain epigastric and radiates to cehst. Comes a few times a day sometimes skips a day or so) since onset. The pain is located in the epigastric region. The quality of the pain is shooting and burning. The pain is moderate. Associated symptoms include nausea and chest pain. Pertinent negatives include no anorexia, no fever, no belching, no diarrhea, no flatus, no hematochezia, no melena, no vomiting, no constipation, no dysuria, no frequency, no hematuria, no headaches, no myalgias, no trauma and no back pain. Nothing worsens the pain. The pain is relieved by nothing. Her past medical history is significant for DM (sugars have been \"a little high\" but this \"does not feel like my diabetes\"). IMM UTD    Past Medical History:   Diagnosis Date    Endocrine disease     type 1 diabetes       History reviewed. No pertinent surgical history. History reviewed. No pertinent family history.     Social History     Socioeconomic History    Marital status: SINGLE     Spouse name: Not on file    Number of children: Not on file    Years of education: Not on file    Highest education level: Not on file   Occupational History    Not on file   Social Needs    Financial resource strain: Not on file    Food insecurity     Worry: Not on file     Inability: Not on file    Transportation needs     Medical: Not on file     Non-medical: Not on file   Tobacco Use    Smoking status: Never Smoker    Smokeless tobacco: Never Used   Substance and Sexual Activity    Alcohol use: Not Currently    Drug use: Never    Sexual activity: Yes   Lifestyle    Physical activity     Days per week: Not on file     Minutes per session: Not on file    Stress: Not on file   Relationships    Social connections     Talks on phone: Not on file     Gets together: Not on file     Attends Yazidism service: Not on file     Active member of club or organization: Not on file     Attends meetings of clubs or organizations: Not on file     Relationship status: Not on file    Intimate partner violence     Fear of current or ex partner: Not on file     Emotionally abused: Not on file     Physically abused: Not on file     Forced sexual activity: Not on file   Other Topics Concern    Not on file   Social History Narrative    Not on file         ALLERGIES: Patient has no known allergies. Review of Systems   Constitutional: Negative for activity change, appetite change, chills and fever. HENT: Negative for sore throat. Eyes: Negative for photophobia. Respiratory: Negative for cough, choking, chest tightness and shortness of breath. Cardiovascular: Positive for chest pain. Gastrointestinal: Positive for abdominal pain and nausea. Negative for anorexia, constipation, diarrhea, flatus, hematochezia, melena and vomiting. Genitourinary: Negative for decreased urine volume, difficulty urinating, dysuria, frequency, hematuria, vaginal bleeding and vaginal discharge. Musculoskeletal: Negative for back pain, myalgias, neck pain and neck stiffness. Skin: Negative for rash and wound. Allergic/Immunologic: Negative for immunocompromised state. Neurological: Negative for light-headedness and headaches. Hematological: Does not bruise/bleed easily. Psychiatric/Behavioral: Negative for confusion. Vitals:    09/30/20 2154   BP: (!) 141/95   Pulse: (!) 109   Resp: 20   Temp: 98.5 °F (36.9 °C)   SpO2: 99%            Physical Exam   Physical Exam   Constitutional: Appears well-developed and well-nourished. active. No distress. HENT:   Head: NCAT  Ears: Right Ear: Tympanic membrane normal. Left Ear: Tympanic membrane normal.   Nose: Nose normal. No nasal discharge.    Mouth/Throat: Mucous membranes are moist. Pharynx is normal.   Eyes: Conjunctivae are normal. Right eye exhibits no discharge. Left eye exhibits no discharge. Neck: Normal range of motion. Neck supple. Cardiovascular: Normal rate, regular rhythm, S1 normal and S2 normal. No murmur. 2+ distal pulses   Pulmonary/Chest: Effort normal and breath sounds normal. No nasal flaring or stridor. No respiratory distress. no wheezes. no rhonchi. no rales. no retraction. Abdominal: Soft. Mild  tenderness. no rebound or guarding. No Sin's sign. No masses or HSM  Musculoskeletal: Normal range of motion. no edema, no tenderness, no deformity and no signs of injury. Lymphadenopathy:  no cervical adenopathy. Neurological:  alert. normal strength. normal muscle tone. No focal defecits  Skin: Skin is warm and dry. Capillary refill takes less than 3 seconds. Turgor is normal. No petechiae, no purpura and no rash noted. No cyanosis. MDM     Patient is well hydrated, well appearing, and in no respiratory distress. Physical exam is reassuring, and without signs of serious illness. Given the patient's history, clinical course, physical exam, labs, and imaging findings, abdominal pain is unlikely secondary to a surgical etiology.      Recent Results (from the past 24 hour(s))   EKG, 12 LEAD, INITIAL    Collection Time: 09/30/20  9:51 PM   Result Value Ref Range    Ventricular Rate 101 BPM    Atrial Rate 101 BPM    P-R Interval 166 ms    QRS Duration 84 ms    Q-T Interval 366 ms    QTC Calculation (Bezet) 474 ms    Calculated P Axis 44 degrees    Calculated R Axis 3 degrees    Calculated T Axis 25 degrees    Diagnosis Sinus tachycardia  No previous ECGs available      EKG, 12 LEAD, INITIAL    Collection Time: 09/30/20 10:13 PM   Result Value Ref Range    Ventricular Rate 89 BPM    Atrial Rate 89 BPM    P-R Interval 168 ms    QRS Duration 78 ms    Q-T Interval 366 ms    QTC Calculation (Bezet) 445 ms    Calculated P Axis 55 degrees    Calculated R Axis 6 degrees    Calculated T Axis 18 degrees    Diagnosis Normal sinus rhythm  When compared with ECG of 30-SEP-2020 21:51,  MANUAL COMPARISON REQUIRED, DATA IS UNCONFIRMED     GLUCOSE, POC    Collection Time: 09/30/20 10:28 PM   Result Value Ref Range    Glucose (POC) 114 (H) 65 - 100 mg/dL    Performed by Ning Walton    CBC WITH AUTOMATED DIFF    Collection Time: 09/30/20 10:31 PM   Result Value Ref Range    WBC 10.3 3.6 - 11.0 K/uL    RBC 5.10 3.80 - 5.20 M/uL    HGB 14.7 11.5 - 16.0 g/dL    HCT 41.3 35.0 - 47.0 %    MCV 81.0 80.0 - 99.0 FL    MCH 28.8 26.0 - 34.0 PG    MCHC 35.6 30.0 - 36.5 g/dL    RDW 11.2 (L) 11.5 - 14.5 %    PLATELET 722 404 - 836 K/uL    MPV 9.1 8.9 - 12.9 FL    NRBC 0.0 0  WBC    ABSOLUTE NRBC 0.00 0.00 - 0.01 K/uL    NEUTROPHILS 62 32 - 75 %    LYMPHOCYTES 28 12 - 49 %    MONOCYTES 8 5 - 13 %    EOSINOPHILS 1 0 - 7 %    BASOPHILS 1 0 - 1 %    IMMATURE GRANULOCYTES 0 0.0 - 0.5 %    ABS. NEUTROPHILS 6.4 1.8 - 8.0 K/UL    ABS. LYMPHOCYTES 2.9 0.8 - 3.5 K/UL    ABS. MONOCYTES 0.8 0.0 - 1.0 K/UL    ABS. EOSINOPHILS 0.1 0.0 - 0.4 K/UL    ABS. BASOPHILS 0.1 0.0 - 0.1 K/UL    ABS. IMM. GRANS. 0.0 0.00 - 0.04 K/UL    DF AUTOMATED     METABOLIC PANEL, COMPREHENSIVE    Collection Time: 09/30/20 10:31 PM   Result Value Ref Range    Sodium 139 136 - 145 mmol/L    Potassium 3.4 (L) 3.5 - 5.1 mmol/L    Chloride 106 97 - 108 mmol/L    CO2 24 21 - 32 mmol/L    Anion gap 9 5 - 15 mmol/L    Glucose 129 (H) 65 - 100 mg/dL    BUN 13 6 - 20 MG/DL    Creatinine 0.79 0.55 - 1.02 MG/DL    BUN/Creatinine ratio 16 12 - 20      GFR est AA >60 >60 ml/min/1.73m2    GFR est non-AA >60 >60 ml/min/1.73m2    Calcium 9.5 8.5 - 10.1 MG/DL    Bilirubin, total 0.4 0.2 - 1.0 MG/DL    ALT (SGPT) 24 12 - 78 U/L    AST (SGOT) 16 15 - 37 U/L    Alk.  phosphatase 77 45 - 117 U/L    Protein, total 7.1 6.4 - 8.2 g/dL    Albumin 3.5 3.5 - 5.0 g/dL    Globulin 3.6 2.0 - 4.0 g/dL    A-G Ratio 1.0 (L) 1.1 - 2.2     LIPASE    Collection Time: 09/30/20 10:31 PM   Result Value Ref Range Lipase 80 73 - 393 U/L   SAMPLES BEING HELD    Collection Time: 09/30/20 10:31 PM   Result Value Ref Range    SAMPLES BEING HELD 2RED, 1BLU      COMMENT        Add-on orders for these samples will be processed based on acceptable specimen integrity and analyte stability, which may vary by analyte. URINALYSIS W/MICROSCOPIC    Collection Time: 09/30/20 10:52 PM   Result Value Ref Range    Color YELLOW/STRAW      Appearance CLOUDY (A) CLEAR      Specific gravity 1.022 1.003 - 1.030      pH (UA) 6.0 5.0 - 8.0      Protein Negative NEG mg/dL    Glucose >1,000 (A) NEG mg/dL    Ketone Negative NEG mg/dL    Bilirubin Negative NEG      Blood Negative NEG      Urobilinogen 0.2 0.2 - 1.0 EU/dL    Nitrites Negative NEG      Leukocyte Esterase TRACE (A) NEG      WBC 0-4 0 - 4 /hpf    RBC 0-5 0 - 5 /hpf    Epithelial cells FEW FEW /lpf    Bacteria Negative NEG /hpf   URINE CULTURE HOLD SAMPLE    Collection Time: 09/30/20 10:52 PM    Specimen: Serum; Urine   Result Value Ref Range    Urine culture hold        Urine on hold in Microbiology dept for 2 days. If unpreserved urine is submitted, it cannot be used for addtional testing after 24 hours, recollection will be required. HCG URINE, QL. - POC    Collection Time: 09/30/20 10:54 PM   Result Value Ref Range    Pregnancy test,urine (POC) Negative NEG     GLUCOSE, POC    Collection Time: 09/30/20 11:17 PM   Result Value Ref Range    Glucose (POC) 74 65 - 100 mg/dL    Performed by [address] [place] [address], POC    Collection Time: 09/30/20 11:58 PM   Result Value Ref Range    Glucose (POC) 117 (H) 65 - 100 mg/dL    Performed by [address] [place] [address]    Result Date: 10/1/2020  INDICATION:   Epigastric pain COMPARISON:  None FINDINGS: Limited right upper quadrant ultrasound was performed. Gallbladder:   No gallstones. No wall thickening. Negative sonographic Sin's sign. Common Bile Duct:  4 mm. Liver:  Normal in size and echotexture.  Main Portal Vein:  Patent and appropriate hepatopetal flow. Pancreas Head (Body and Tail not evaluated): Obscured by bowel gas. Right kidney:  10.4 cm in length. No nephrolithiasis or hydronephrosis. Other:  None. IMPRESSION: No acute process. ED EKG interpretation:  Rhythm: normal sinus rhythm; and regular . Rate (approx.): 89; Axis: normal; QRS interval: normal ; ST/T wave: normal; QTc 474; This EKG was interpreted by Sarah Lopez MD, ED Provider. Patient will be discharged home with pain control and follow-up with primary care physician in one to two days. Patient and caregivers were instructed on signs and symptoms of reasons to return including fever, worsening pain, vomiting, blood in the stool or any other concerns. ICD-10-CM ICD-9-CM   1. Acute gastritis without hemorrhage, unspecified gastritis type  K29.00 535.00       Current Discharge Medication List      START taking these medications    Details   Omeprazole delayed release (PRILOSEC D/R) 20 mg tablet Take 1 Tab by mouth daily. Qty: 30 Tab, Refills: 0      ondansetron (Zofran ODT) 4 mg disintegrating tablet Take 1 Tab by mouth every eight (8) hours as needed for Nausea for up to 360 days. Qty: 10 Tab, Refills: 0             Follow-up Information     Follow up With Specialties Details Why Contact Info    Sabrina Bojorquez MD Gastroenterology, Gastroenterology Schedule an appointment as soon as possible for a visit  [address] [place] [address]  [phone]            The patient verbalized understanding. 12:23 AM  Karen Lama M.D.     Procedures

## 2020-12-01 ENCOUNTER — TRANSCRIBE ORDER (OUTPATIENT)
Dept: SCHEDULING | Age: 25
End: 2020-12-01

## 2020-12-01 DIAGNOSIS — R68.81 EARLY SATIETY: ICD-10-CM

## 2020-12-01 DIAGNOSIS — R07.9 CHEST PAIN, UNSPECIFIED: Primary | ICD-10-CM

## 2020-12-01 DIAGNOSIS — E10.9 DIABETES MELLITUS TYPE I (HCC): ICD-10-CM

## 2021-01-14 ENCOUNTER — HOSPITAL ENCOUNTER (OUTPATIENT)
Dept: PREADMISSION TESTING | Age: 26
Discharge: HOME | End: 2021-01-14
Payer: OTHER GOVERNMENT

## 2021-01-14 ENCOUNTER — TRANSCRIBE ORDER (OUTPATIENT)
Dept: REGISTRATION | Age: 26
End: 2021-01-14

## 2021-01-14 DIAGNOSIS — Z01.812 PRE-PROCEDURE LAB EXAM: Primary | ICD-10-CM

## 2021-01-14 DIAGNOSIS — Z01.812 PRE-PROCEDURE LAB EXAM: ICD-10-CM

## 2021-01-14 PROCEDURE — U0003 INFECTIOUS AGENT DETECTION BY NUCLEIC ACID (DNA OR RNA); SEVERE ACUTE RESPIRATORY SYNDROME CORONAVIRUS 2 (SARS-COV-2) (CORONAVIRUS DISEASE [COVID-19]), AMPLIFIED PROBE TECHNIQUE, MAKING USE OF HIGH THROUGHPUT TECHNOLOGIES AS DESCRIBED BY CMS-2020-01-R: HCPCS

## 2021-01-15 LAB — SARS-COV-2, COV2NT: NOT DETECTED

## 2021-02-08 ENCOUNTER — VIRTUAL VISIT (OUTPATIENT)
Dept: INTERNAL MEDICINE CLINIC | Age: 26
End: 2021-02-08
Payer: OTHER GOVERNMENT

## 2021-02-08 DIAGNOSIS — L70.9 ACNE, UNSPECIFIED ACNE TYPE: ICD-10-CM

## 2021-02-08 DIAGNOSIS — K21.9 GASTROESOPHAGEAL REFLUX DISEASE, UNSPECIFIED WHETHER ESOPHAGITIS PRESENT: ICD-10-CM

## 2021-02-08 DIAGNOSIS — E10.9 TYPE 1 DIABETES MELLITUS WITHOUT COMPLICATION (HCC): ICD-10-CM

## 2021-02-08 DIAGNOSIS — E10.9 TYPE 1 DIABETES MELLITUS WITHOUT COMPLICATION (HCC): Primary | ICD-10-CM

## 2021-02-08 PROCEDURE — 99203 OFFICE O/P NEW LOW 30 MIN: CPT | Performed by: INTERNAL MEDICINE

## 2021-02-08 RX ORDER — INSULIN ASPART 100 [IU]/ML
10-16 INJECTION, SOLUTION INTRAVENOUS; SUBCUTANEOUS
Qty: 5 PEN | Refills: 1 | Status: SHIPPED | OUTPATIENT
Start: 2021-02-08 | End: 2021-09-03 | Stop reason: SDUPTHER

## 2021-02-08 RX ORDER — INSULIN GLARGINE 100 [IU]/ML
45 INJECTION, SOLUTION SUBCUTANEOUS AS NEEDED
Qty: 5 PEN | Refills: 1 | Status: SHIPPED | OUTPATIENT
Start: 2021-02-08 | End: 2021-09-03 | Stop reason: SDUPTHER

## 2021-02-08 RX ORDER — INSULIN ASPART 100 [IU]/ML
10-16 INJECTION, SOLUTION INTRAVENOUS; SUBCUTANEOUS AS NEEDED
COMMUNITY
Start: 2020-12-12 | End: 2021-02-08 | Stop reason: SDUPTHER

## 2021-02-08 RX ORDER — INSULIN ASPART 100 [IU]/ML
INJECTION, SOLUTION INTRAVENOUS; SUBCUTANEOUS
Qty: 9 VIAL | Refills: 1 | Status: SHIPPED | OUTPATIENT
Start: 2021-02-08 | End: 2021-05-28 | Stop reason: SDUPTHER

## 2021-02-08 RX ORDER — BISMUTH SUBSALICYLATE 262 MG
1 TABLET,CHEWABLE ORAL DAILY
COMMUNITY
End: 2021-05-28 | Stop reason: ALTCHOICE

## 2021-02-08 RX ORDER — INSULIN GLARGINE 100 [IU]/ML
45 INJECTION, SOLUTION SUBCUTANEOUS AS NEEDED
COMMUNITY
Start: 2020-12-10 | End: 2021-02-08 | Stop reason: SDUPTHER

## 2021-02-08 NOTE — PROGRESS NOTES
Deloris Ahr, who was evaluated through a synchronous (real-time) audio-video encounter, and/or her healthcare decision maker, is aware that it is a billable service, with coverage as determined by her insurance carrier. She provided verbal consent to proceed: Yes, and patient identification was verified. It was conducted pursuant to the emergency declaration under the [facility], [facility] authority and the [employer] and [employer] General Act. A caregiver was present when appropriate. Ability to conduct physical exam was limited. I was at home. The patient was at home. HISTORY OF PRESENT ILLNESS  Senia Toscano is a 26 y.o. female. HPI  New patient to my practice. 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 Type I DM. Diagnosed at age 13. Currently on Metronic pump. Checking sugars intermittently. Had a CGM which pairs with her pump but was not accurate so she stopped. In the past A1C has been running 9-11. Seeing ophthalmologist regularly. Last seen in March and has appointment scheduled for this March. She was admitted 2 years ago, fall 2018, for DKA. Feels following DM diet and good at counting carbs to program meter. No vision or neuropathy issues. No polyuria or polydypsia. Scheduled to have an endoscopy 2/26 with . Over the summer having chest pain. Used to be on birth control. Having issues with prolonged menses. Also using for acne and to regular hormones. Last pap was 1 year ago with PCP. Currently sexually active. Past Medical History:   Diagnosis Date    Endocrine disease     type 1 diabetes     No past surgical history on file. No Known Allergies  No family history on file. Social hx reviewed and updated in chart. Current Outpatient Medications:     NovoLOG Flexpen U-100 Insulin 100 unit/mL (3 mL) inpn, 10-16 Units by SubCUTAneous route as needed. , Disp: , Rfl:     Lantus Solostar U-100 Insulin 100 unit/mL (3 mL) inpn, 45 Units by SubCUTAneous route as needed. , Disp: , Rfl:     multivitamin (ONE A DAY) tablet, Take 1 Tab by mouth daily. , Disp: , Rfl:     ondansetron (Zofran ODT) 4 mg disintegrating tablet, Take 1 Tab by mouth every eight (8) hours as needed for Nausea for up to 360 days. , Disp: 10 Tab, Rfl: 0    insulin aspart (NOVOLOG) 100 unit/mL injection, by SubCUTAneous route. Patient has sliding scale for use with insulin pump., Disp: , Rfl:     No flowsheet data found. ROS   See above  Physical Exam  Vitals signs reviewed. Constitutional:       Appearance: Normal appearance. HENT:      Head: Normocephalic and atraumatic. Neck:      Comments: nml appearance  Pulmonary:      Effort: Pulmonary effort is normal.      Comments: nml rate    Skin:     Comments: Acne on face   Neurological:      Mental Status: She is alert. ASSESSMENT and PLAN  Type I diabetes - not well controlled in past.  Referred to Endocrinology. Check labs. Refilled meds. GERD with chest pain - upcoming EGD through DR [place]  Acne - restart OCP - lo-estrin. Orders Placed This Encounter    LIPID PANEL    METABOLIC PANEL, COMPREHENSIVE    HEMOGLOBIN A1C WITH EAG    MICROALBUMIN, UR, RAND W/ MICROALB/CREAT RATIO    [facility]    DISCONTD: NovoLOG Flexpen U-100 Insulin 100 unit/mL (3 mL) inpn    DISCONTD: Lantus Solostar U-100 Insulin 100 unit/mL (3 mL) inpn    multivitamin (ONE A DAY) tablet    NovoLOG Flexpen U-100 Insulin 100 unit/mL (3 mL) inpn    Lantus Solostar U-100 Insulin 100 unit/mL (3 mL) inpn    insulin aspart U-100 (NovoLOG U-100 Insulin aspart) 100 unit/mL injection    norethindrone-e estradiol-iron (LOESTRIN FE) 1 mg-20 mcg (24)/75 mg (4) tab     Follow-up and Dispositions    · Return in about 3 months (around 5/8/2021) for diabetes.

## 2021-02-15 LAB
ALBUMIN SERPL-MCNC: 4.7 G/DL (ref 3.9–5)
ALBUMIN/CREAT UR: <8 MG/G CREAT (ref 0–29)
ALBUMIN/GLOB SERPL: 1.9 {RATIO} (ref 1.2–2.2)
ALP SERPL-CCNC: 94 IU/L (ref 39–117)
ALT SERPL-CCNC: 17 IU/L (ref 0–32)
AST SERPL-CCNC: 14 IU/L (ref 0–40)
BILIRUB SERPL-MCNC: 0.7 MG/DL (ref 0–1.2)
BUN SERPL-MCNC: 19 MG/DL (ref 6–20)
BUN/CREAT SERPL: 20 (ref 9–23)
CALCIUM SERPL-MCNC: 9.6 MG/DL (ref 8.7–10.2)
CHLORIDE SERPL-SCNC: 96 MMOL/L (ref 96–106)
CHOLEST SERPL-MCNC: 229 MG/DL (ref 100–199)
CO2 SERPL-SCNC: 18 MMOL/L (ref 20–29)
CREAT SERPL-MCNC: 0.95 MG/DL (ref 0.57–1)
CREAT UR-MCNC: 36.3 MG/DL
EST. AVERAGE GLUCOSE BLD GHB EST-MCNC: 249 MG/DL
GLOBULIN SER CALC-MCNC: 2.5 G/DL (ref 1.5–4.5)
GLUCOSE SERPL-MCNC: 431 MG/DL (ref 65–99)
HBA1C MFR BLD: 10.3 % (ref 4.8–5.6)
HDLC SERPL-MCNC: 66 MG/DL
INTERPRETATION, 910389: NORMAL
LDLC SERPL CALC-MCNC: 128 MG/DL (ref 0–99)
Lab: NORMAL
MICROALBUMIN UR-MCNC: <3 UG/ML
POTASSIUM SERPL-SCNC: 4.2 MMOL/L (ref 3.5–5.2)
PROT SERPL-MCNC: 7.2 G/DL (ref 6–8.5)
SODIUM SERPL-SCNC: 131 MMOL/L (ref 134–144)
TRIGL SERPL-MCNC: 201 MG/DL (ref 0–149)
VLDLC SERPL CALC-MCNC: 35 MG/DL (ref 5–40)

## 2021-02-16 NOTE — TELEPHONE ENCOUNTER
norethindrone-e estradiol-iron (LOESTRIN FE) 1 mg-20 mcg       Is patient out of this medication (yes/no):       Pharmacy name: [address] listed in chart? (yes/no): Yes   Pharmacy phone number: [phone]       Details to clarify the request: PT states that the pharmacy never got the Rx to fill       [facility]

## 2021-02-22 ENCOUNTER — HOSPITAL ENCOUNTER (OUTPATIENT)
Dept: PREADMISSION TESTING | Age: 26
Discharge: HOME | End: 2021-02-22
Payer: OTHER GOVERNMENT

## 2021-02-22 ENCOUNTER — TRANSCRIBE ORDER (OUTPATIENT)
Dept: REGISTRATION | Age: 26
End: 2021-02-22

## 2021-02-22 DIAGNOSIS — Z01.812 PRE-PROCEDURE LAB EXAM: Primary | ICD-10-CM

## 2021-02-22 DIAGNOSIS — Z01.812 PRE-PROCEDURE LAB EXAM: ICD-10-CM

## 2021-02-22 PROCEDURE — U0003 INFECTIOUS AGENT DETECTION BY NUCLEIC ACID (DNA OR RNA); SEVERE ACUTE RESPIRATORY SYNDROME CORONAVIRUS 2 (SARS-COV-2) (CORONAVIRUS DISEASE [COVID-19]), AMPLIFIED PROBE TECHNIQUE, MAKING USE OF HIGH THROUGHPUT TECHNOLOGIES AS DESCRIBED BY CMS-2020-01-R: HCPCS

## 2021-02-23 LAB — SARS-COV-2, COV2NT: NOT DETECTED

## 2021-02-26 ENCOUNTER — ANESTHESIA (OUTPATIENT)
Dept: ENDOSCOPY | Age: 26
End: 2021-02-26
Payer: OTHER GOVERNMENT

## 2021-02-26 ENCOUNTER — HOSPITAL ENCOUNTER (OUTPATIENT)
Age: 26
Setting detail: OUTPATIENT SURGERY
Discharge: HOME | End: 2021-02-26
Attending: INTERNAL MEDICINE | Admitting: INTERNAL MEDICINE
Payer: OTHER GOVERNMENT

## 2021-02-26 ENCOUNTER — ANESTHESIA EVENT (OUTPATIENT)
Dept: ENDOSCOPY | Age: 26
End: 2021-02-26
Payer: OTHER GOVERNMENT

## 2021-02-26 VITALS
HEART RATE: 79 BPM | DIASTOLIC BLOOD PRESSURE: 76 MMHG | TEMPERATURE: 98.2 F | HEIGHT: 67 IN | BODY MASS INDEX: 28.72 KG/M2 | RESPIRATION RATE: 15 BRPM | OXYGEN SATURATION: 100 % | WEIGHT: 183 LBS | SYSTOLIC BLOOD PRESSURE: 109 MMHG

## 2021-02-26 LAB
GLUCOSE BLD STRIP.AUTO-MCNC: 176 MG/DL (ref 65–100)
HCG UR QL: NEGATIVE
SERVICE CMNT-IMP: ABNORMAL

## 2021-02-26 PROCEDURE — 74011250636 HC RX REV CODE- 250/636: Performed by: NURSE ANESTHETIST, CERTIFIED REGISTERED

## 2021-02-26 PROCEDURE — 81025 URINE PREGNANCY TEST: CPT

## 2021-02-26 PROCEDURE — 76040000007: Performed by: INTERNAL MEDICINE

## 2021-02-26 PROCEDURE — 77030021593 HC FCPS BIOP ENDOSC BSC -A: Performed by: INTERNAL MEDICINE

## 2021-02-26 PROCEDURE — 2709999900 HC NON-CHARGEABLE SUPPLY: Performed by: INTERNAL MEDICINE

## 2021-02-26 PROCEDURE — 76060000032 HC ANESTHESIA 0.5 TO 1 HR: Performed by: INTERNAL MEDICINE

## 2021-02-26 PROCEDURE — 88305 TISSUE EXAM BY PATHOLOGIST: CPT

## 2021-02-26 PROCEDURE — 82962 GLUCOSE BLOOD TEST: CPT

## 2021-02-26 PROCEDURE — 74011000250 HC RX REV CODE- 250: Performed by: NURSE ANESTHETIST, CERTIFIED REGISTERED

## 2021-02-26 RX ORDER — FLUMAZENIL 0.1 MG/ML
0.2 INJECTION INTRAVENOUS
Status: DISCONTINUED | OUTPATIENT
Start: 2021-02-26 | End: 2021-02-26 | Stop reason: HOSPADM

## 2021-02-26 RX ORDER — SODIUM CHLORIDE 9 MG/ML
50 INJECTION, SOLUTION INTRAVENOUS CONTINUOUS
Status: DISCONTINUED | OUTPATIENT
Start: 2021-02-26 | End: 2021-02-26 | Stop reason: HOSPADM

## 2021-02-26 RX ORDER — PROPOFOL 10 MG/ML
INJECTION, EMULSION INTRAVENOUS AS NEEDED
Status: DISCONTINUED | OUTPATIENT
Start: 2021-02-26 | End: 2021-02-26 | Stop reason: HOSPADM

## 2021-02-26 RX ORDER — LIDOCAINE HYDROCHLORIDE 20 MG/ML
INJECTION, SOLUTION EPIDURAL; INFILTRATION; INTRACAUDAL; PERINEURAL AS NEEDED
Status: DISCONTINUED | OUTPATIENT
Start: 2021-02-26 | End: 2021-02-26 | Stop reason: HOSPADM

## 2021-02-26 RX ORDER — EPINEPHRINE 0.1 MG/ML
1 INJECTION INTRACARDIAC; INTRAVENOUS
Status: DISCONTINUED | OUTPATIENT
Start: 2021-02-26 | End: 2021-02-26 | Stop reason: HOSPADM

## 2021-02-26 RX ORDER — SODIUM CHLORIDE 0.9 % (FLUSH) 0.9 %
5-40 SYRINGE (ML) INJECTION AS NEEDED
Status: DISCONTINUED | OUTPATIENT
Start: 2021-02-26 | End: 2021-02-26 | Stop reason: HOSPADM

## 2021-02-26 RX ORDER — ATROPINE SULFATE 0.1 MG/ML
0.5 INJECTION INTRAVENOUS
Status: DISCONTINUED | OUTPATIENT
Start: 2021-02-26 | End: 2021-02-26 | Stop reason: HOSPADM

## 2021-02-26 RX ORDER — SODIUM CHLORIDE 9 MG/ML
INJECTION, SOLUTION INTRAVENOUS
Status: DISCONTINUED | OUTPATIENT
Start: 2021-02-26 | End: 2021-02-26 | Stop reason: HOSPADM

## 2021-02-26 RX ORDER — DEXTROMETHORPHAN/PSEUDOEPHED 2.5-7.5/.8
1.2 DROPS ORAL
Status: DISCONTINUED | OUTPATIENT
Start: 2021-02-26 | End: 2021-02-26 | Stop reason: HOSPADM

## 2021-02-26 RX ORDER — SODIUM CHLORIDE 0.9 % (FLUSH) 0.9 %
5-40 SYRINGE (ML) INJECTION EVERY 8 HOURS
Status: DISCONTINUED | OUTPATIENT
Start: 2021-02-26 | End: 2021-02-26 | Stop reason: HOSPADM

## 2021-02-26 RX ORDER — NALOXONE HYDROCHLORIDE 0.4 MG/ML
0.4 INJECTION, SOLUTION INTRAMUSCULAR; INTRAVENOUS; SUBCUTANEOUS
Status: DISCONTINUED | OUTPATIENT
Start: 2021-02-26 | End: 2021-02-26 | Stop reason: HOSPADM

## 2021-02-26 RX ADMIN — PROPOFOL 100 MG: 10 INJECTION, EMULSION INTRAVENOUS at 07:50

## 2021-02-26 RX ADMIN — PROPOFOL 30 MG: 10 INJECTION, EMULSION INTRAVENOUS at 07:57

## 2021-02-26 RX ADMIN — PROPOFOL 50 MG: 10 INJECTION, EMULSION INTRAVENOUS at 07:52

## 2021-02-26 RX ADMIN — PROPOFOL 50 MG: 10 INJECTION, EMULSION INTRAVENOUS at 07:55

## 2021-02-26 RX ADMIN — PROPOFOL 50 MG: 10 INJECTION, EMULSION INTRAVENOUS at 07:54

## 2021-02-26 RX ADMIN — LIDOCAINE HYDROCHLORIDE 100 MG: 20 INJECTION, SOLUTION EPIDURAL; INFILTRATION; INTRACAUDAL; PERINEURAL at 07:50

## 2021-02-26 RX ADMIN — SODIUM CHLORIDE: 900 INJECTION, SOLUTION INTRAVENOUS at 07:26

## 2021-02-26 NOTE — PROCEDURES
[address]  [address] [facility] [address], [address]   [phone]                            NAME:  Savita Castillo   :   1995   MRN:   [id number]     Date/Time:  2021 8:03 AM    Esophagogastroduodenoscopy (EGD) Procedure Note    :  Nicolasa Bojorquez MD    Staff: Endoscopy Technician-1: Rajendra Montilla  Endoscopy RN-1: Liz Phalen, RN     Implants: none    Referring Provider:  Tierra Howard MD    Anethesia/Sedation:  MAC anesthesia    Procedure Details     After infom consent was obtained for the procedure, with all risks and benefits of procedure explained the patient was taken to the endoscopy suite and placed in the left lateral decubitus position. Following sequential administration of sedation as per above, the [id number] gastroscope was inserted into the mouth and advanced under direct vision to second portion of the duodenum. A careful inspection was made as the gastroscope was withdrawn, including a retroflexed view of the proximal stomach; findings and interventions are described below. Findings:  Esophagus: Normal mucosa. No stricture, esophagitis or ulcer was noted. Stomach: Diffuse congestion and erythema was noted in the gastric antrum and body. Pylorus was patulous. Duodenum/jejunum:normal      Therapies:  biopsy of stomach body, antrum  biopsy of duodenal second portion    Specimens:  biopsy of stomach body, antrum  biopsy of duodenal second portion           EBL: None    Complications:   None; patient tolerated the procedure well. Impression:    See Postoperative diagnosis above    Recommendations:  -Continue acid suppression. , -Await pathology. , -Follow symptoms. , -Low fat diet. , -Await gastric emptying scan  -If gastric emptying scan is negative, would then consider esophageal manometry.     Discharge disposition:  Home in the company of  when able to ambulate    Nicolasa Bojorquez MD

## 2021-02-26 NOTE — ANESTHESIA PREPROCEDURE EVALUATION
Relevant Problems   No relevant active problems       Anesthetic History   No history of anesthetic complications            Review of Systems / Medical History  Patient summary reviewed, nursing notes reviewed and pertinent labs reviewed    Pulmonary  Within defined limits                 Neuro/Psych   Within defined limits           Cardiovascular  Within defined limits                Exercise tolerance: >4 METS     GI/Hepatic/Renal  Within defined limits             Comments: Early satiety/CP Endo/Other  Within defined limits  Diabetes: type 1, using insulin         Other Findings              Physical Exam    Airway  Mallampati: II  TM Distance: > 6 cm  Neck ROM: normal range of motion   Mouth opening: Normal     Cardiovascular  Regular rate and rhythm,  S1 and S2 normal,  no murmur, click, rub, or gallop             Dental  No notable dental hx       Pulmonary  Breath sounds clear to auscultation               Abdominal  GI exam deferred       Other Findings            Anesthetic Plan    ASA: 2  Anesthesia type: MAC          Induction: Intravenous  Anesthetic plan and risks discussed with: Patient

## 2021-02-26 NOTE — PERIOP NOTES
0730: .Patient has been evaluated by anesthesia pre-procedure. Patient alert and oriented. Vital signs will not be charted by the Endoscopy nurse. All vitals, airway, and loc are monitored by anesthesia staff throughout procedure. .Endoscope will be pre-cleaned at bedside immediately following procedure by Shira Thomas.

## 2021-02-26 NOTE — DISCHARGE INSTRUCTIONS
[address]  [address] [facility]  [phone]                     DISCHARGE INSTRUCTIONS    Parul Ontiveros  [id number]  1995    DISCOMFORT:  Sore throat- throat lozenges or warm salt water gargle  redness at IV site- apply warm compress to area; if redness or soreness persist- contact your physician  Gaseous discomfort- walking, belching will help relieve any discomfort    DIET  You may eat and drink after you leave. You may resume your regular diet - however -  remember your colon is empty and a heavy meal will produce gas. Avoid these foods:  vegetables, fried / greasy foods, carbonated drinks   You may not drink alcoholic beverages for at least 12 hours    ACTIVITY  You may resume your normal daily activities   Spend the remainder of the day resting -  avoid any strenuous activity. You may not operate a vehicle for 12 hours  You may not engage in an occupation involving machinery or appliances for rest of today  Avoid making any critical decisions for at least 24 hour    CALL M.D. ANY SIGN OF   Increasing pain, nausea, vomiting  Abdominal distension (swelling)  New increased bleeding (oral or rectal)  Fever (chills)  Pain in chest area  Bloody discharge from nose or mouth  Shortness of breath    Follow-up Instructions:   Call Dr. Dilia Workman for any questions or problems. If we took a biopsy please call the office within 2 weeks to discuss your pathology results. Telephone # [phone]       ENDOSCOPY FINDINGS:   gastritis, patulous pyloris         Learning About Coronavirus (COVID-19)  Coronavirus (COVID-19): Overview  What is coronavirus ([id number]-54)? The coronavirus disease (COVID-19) is caused by a virus. It is an illness that was first found in [place], [place], in December 2019. It has since spread worldwide. The virus can cause fever, cough, and trouble breathing. In severe cases, it can cause pneumonia and make it hard to breathe without help.  It can cause death. Coronaviruses are a large group of viruses. They cause the common cold. They also cause more serious illnesses like Middle East respiratory syndrome (MERS) and severe acute respiratory syndrome (SARS). COVID-19 is caused by a novel coronavirus. That means it's a new type that has not been seen in people before. This virus spreads person-to-person through droplets from coughing and sneezing. It can also spread when you are close to someone who is infected. And it can spread when you touch something that has the virus on it, such as a doorknob or a tabletop. What can you do to protect yourself from coronavirus (COVID-19)? The best way to protect yourself from getting sick is to:  · Avoid areas where there is an outbreak. · Avoid contact with people who may be infected. · Wash your hands often with soap or alcohol-based hand sanitizers. · Avoid crowds and try to stay at least 6 feet away from other people. · Wash your hands often, especially after you cough or sneeze. Use soap and water, and scrub for at least 20 seconds. If soap and water aren't available, use an alcohol-based hand . · Avoid touching your mouth, nose, and eyes. What can you do to avoid spreading the virus to others? To help avoid spreading the virus to others:  · Cover your mouth with a tissue when you cough or sneeze. Then throw the tissue in the trash. · Use a disinfectant to clean things that you touch often. · Stay home if you are sick or have been exposed to the virus. Don't go to school, work, or public areas. And don't use public transportation. · If you are sick:  ? Leave your home only if you need to get medical care. But call the doctor's office first so they know you're coming. And wear a face mask, if you have one.  ? If you have a face mask, wear it whenever you're around other people. It can help stop the spread of the virus when you cough or sneeze. ? Clean and disinfect your home every day.  Use household  and disinfectant wipes or sprays. Take special care to clean things that you grab with your hands. These include doorknobs, remote controls, phones, and handles on your refrigerator and microwave. And don't forget countertops, tabletops, bathrooms, and computer keyboards. When to call for help  Call 911 anytime you think you may need emergency care. For example, call if:  · You have severe trouble breathing. (You can't talk at all.)  · You have constant chest pain or pressure. · You are severely dizzy or lightheaded. · You are confused or can't think clearly. · Your face and lips have a blue color. · You pass out (lose consciousness) or are very hard to wake up. Call your doctor now if you develop symptoms such as:  · Shortness of breath. · Fever. · Cough. If you need to get care, call ahead to the doctor's office for instructions before you go. Make sure you wear a face mask, if you have one, to prevent exposing other people to the virus. Where can you get the latest information? The following health organizations are tracking and studying this virus. Their websites contain the most up-to-date information. Oneil Ewingger also learn what to do if you think you may have been exposed to the virus. · U.S. Centers for Disease Control and Prevention (CDC): The CDC provides updated news about the disease and travel advice. The website also tells you how to prevent the spread of infection. www.cdc.gov  · World Health Organization [facility]): WHO offers information about the virus outbreaks. WHO also has travel advice. www.who.int  Current as of: April 1, 2020               Content Version: 12.4  © 5440-1435 Healthwise, Incorporated.    Care instructions adapted under license by your healthcare professional. If you have questions about a medical condition or this instruction, always ask your healthcare professional. [address] any warranty or liability for your use of this information.

## 2021-02-26 NOTE — H&P
[address] [facility].  [address] [facility] [address] [facility], [address]   [phone]                                History and Physical     NAME: Deloris Aquino   :  1995   MRN:  [id number]     HPI:  The patient was seen and examined. History reviewed. No pertinent surgical history. Past Medical History:   Diagnosis Date    Endocrine disease     type 1 diabetes     Social History     Tobacco Use    Smoking status: Never Smoker    Smokeless tobacco: Never Used   Substance Use Topics    Alcohol use: Not Currently    Drug use: Never     No Known Allergies  Family History   Problem Relation Age of Onset    Diabetes Father         type 2    Obesity Father     Hypertension Father     Elevated Lipids Father     Diabetes Maternal Grandfather         type I     Current Facility-Administered Medications   Medication Dose Route Frequency    0.9% sodium chloride infusion  50 mL/hr IntraVENous CONTINUOUS    sodium chloride (NS) flush 5-40 mL  5-40 mL IntraVENous Q8H    sodium chloride (NS) flush 5-40 mL  5-40 mL IntraVENous PRN    naloxone (NARCAN) injection 0.4 mg  0.4 mg IntraVENous Multiple    flumazeniL (ROMAZICON) 0.1 mg/mL injection 0.2 mg  0.2 mg IntraVENous Multiple    simethicone (MYLICON) 36UY/3.3SQ oral drops 80 mg  1.2 mL Oral Multiple    atropine injection 0.5 mg  0.5 mg IntraVENous ONCE PRN    EPINEPHrine (ADRENALIN) 0.1 mg/mL syringe 1 mg  1 mg Endoscopically ONCE PRN         PHYSICAL EXAM:  General: WD, WN. Alert, cooperative, no acute distress    HEENT: NC, Atraumatic. PERRLA, EOMI. Anicteric sclerae. Lungs:  CTA Bilaterally. No Wheezing/Rhonchi/Rales. Heart:  Regular  rhythm,  No murmur, No Rubs, No Gallops  Abdomen: Soft, Non distended, Non tender. +Bowel sounds, no HSM  Extremities: No c/c/e  Neurologic:  CN 2-12 gi, Alert and oriented X 3. No acute neurological distress   Psych:   Good insight. Not anxious nor agitated.     The heart, lungs and mental status were satisfactory for the administration of MAC sedation and for the procedure. Mallampati score: 2     The patient was counseled at length about the risks of christine Covid-19 in the coco-operative and post-operative states including the recovery window of their procedure. The patient was made aware that christine Covid-19 after a surgical procedure may worsen their prognosis for recovering from the virus and lend to a higher morbidity and or mortality risk. The patient was given the options of postponing their procedure. All of the risks, benefits, and alternatives were discussed. The patient does  wish to proceed with the procedure.       Assessment:   · Chest pain-non cardiac  · Early satiety    Plan:   · Endoscopic procedure  · MAC sedation   ·

## 2021-02-26 NOTE — ANESTHESIA POSTPROCEDURE EVALUATION
Post-Anesthesia Evaluation and Assessment    Patient: Kym Degree MRN: [id number]  SSN: [id number]    YOB: 1995  Age: 26 y.o. Sex: female      I have evaluated the patient and they are stable and ready for discharge from the PACU. Cardiovascular Function/Vital Signs  Visit Vitals  /76   Pulse 79   Temp 36.8 °C (98.2 °F)   Resp 15   Ht 5' 7\" (1.702 m)   Wt 83 kg (183 lb)   SpO2 100%   BMI 28.66 kg/m²       Patient is status post MAC anesthesia for Procedure(s):  ESOPHAGOGASTRODUODENOSCOPY (EGD)   :-  ESOPHAGOGASTRODUODENAL (EGD) BIOPSY. Nausea/Vomiting: None    Postoperative hydration reviewed and adequate. Pain:  Pain Scale 1: (P) Numeric (0 - 10) (02/26/21 0830)  Pain Intensity 1: (P) 0 (02/26/21 0830)   Managed    Neurological Status: At baseline    Mental Status, Level of Consciousness: Alert and  oriented to person, place, and time    Pulmonary Status:   O2 Device: (P) Room air (02/26/21 0830)   Adequate oxygenation and airway patent    Complications related to anesthesia: None    Post-anesthesia assessment completed. No concerns    Signed By: Ramandeep Childers MD     February 26, 2021              Procedure(s):  ESOPHAGOGASTRODUODENOSCOPY (EGD)   :-  ESOPHAGOGASTRODUODENAL (EGD) BIOPSY. MAC    <BSHSIANPOST>    INITIAL Post-op Vital signs:   Vitals Value Taken Time   BP 89/43 02/26/21 0900   Temp 36.8 °C (98.2 °F) 02/26/21 0810   Pulse 85 02/26/21 0833   Resp 24 02/26/21 0900   SpO2 100 % 02/26/21 0900   Vitals shown include unvalidated device data.

## 2021-03-01 ENCOUNTER — TRANSCRIBE ORDER (OUTPATIENT)
Dept: SCHEDULING | Age: 26
End: 2021-03-01

## 2021-03-01 ENCOUNTER — TELEPHONE (OUTPATIENT)
Dept: INTERNAL MEDICINE CLINIC | Age: 26
End: 2021-03-01

## 2021-03-01 DIAGNOSIS — R68.81 EARLY SATIETY: ICD-10-CM

## 2021-03-01 DIAGNOSIS — K59.00 CONSTIPATION: ICD-10-CM

## 2021-03-01 DIAGNOSIS — E10.9 DIABETES MELLITUS TYPE I (HCC): ICD-10-CM

## 2021-03-01 DIAGNOSIS — R07.9 CHEST PAIN: Primary | ICD-10-CM

## 2021-03-01 NOTE — TELEPHONE ENCOUNTER
Pharmacy says they dont have this Birth Control. They have Micro Jestin 120 . It has 3 more active pills and 3 less iron.

## 2021-03-02 RX ORDER — NORETHINDRONE ACETATE AND ETHINYL ESTRADIOL 1; .02 MG/1; MG/1
1 TABLET ORAL DAILY
Qty: 3 DOSE PACK | Refills: 3 | Status: SHIPPED | OUTPATIENT
Start: 2021-03-02 | End: 2021-05-28 | Stop reason: ALTCHOICE

## 2021-03-02 NOTE — TELEPHONE ENCOUNTER
The birth Control Lo-Estrin is no longer available at that pharmacy she would like to know if she could switch to \"Microgestin\" 120 and if so can a rx be sent to [facility]. [employer]. Callback required yes/no and why: Yes to confirm this request can be completed.        Best contact number(s): [phone]       joshua

## 2021-03-03 ENCOUNTER — TELEPHONE (OUTPATIENT)
Dept: INTERNAL MEDICINE CLINIC | Age: 26
End: 2021-03-03

## 2021-03-18 ENCOUNTER — HOSPITAL ENCOUNTER (OUTPATIENT)
Dept: NUCLEAR MEDICINE | Age: 26
Discharge: HOME | End: 2021-03-18
Attending: INTERNAL MEDICINE
Payer: OTHER GOVERNMENT

## 2021-03-18 DIAGNOSIS — K59.00 CONSTIPATION: ICD-10-CM

## 2021-03-18 DIAGNOSIS — E10.9 DIABETES MELLITUS TYPE I (HCC): ICD-10-CM

## 2021-03-18 DIAGNOSIS — R68.81 EARLY SATIETY: ICD-10-CM

## 2021-03-18 DIAGNOSIS — R07.9 CHEST PAIN: ICD-10-CM

## 2021-03-18 PROCEDURE — 78264 GASTRIC EMPTYING IMG STUDY: CPT

## 2021-05-21 ENCOUNTER — TELEPHONE (OUTPATIENT)
Dept: INTERNAL MEDICINE CLINIC | Age: 26
End: 2021-05-21

## 2021-05-21 NOTE — TELEPHONE ENCOUNTER
----- Message from Ault Emely sent at 5/20/2021 10:26 AM EDT -----  Regarding: FW: Referral Request  Contact: [phone]    ----- Message -----  From: Jb Nuñez  Sent: 5/20/2021  10:14 AM EDT  To: Lalito Bachelor Nurse Pool  Subject: Referral Request                                 Hello,     I just contacted [facility] and made an appointment. the only issue is that the earliest possible appointment is on November 11 (sooo far and too long!) I was wondering if it would be possible for you to fax them an urgent appointment notice or documentation to them so I could get to see them sooner than that. If I need to get more blood work done or anything in the meantime please let me know. I really would like to see them sooner because Im in dire need of supplies related to my pump.  Thank you  For reference,   Their fax is [phone]

## 2021-05-21 NOTE — TELEPHONE ENCOUNTER
Writer has called [facility] per Dr. Medina Lehman request due to patient has type I DM and needs pump supplies and needs to be seen sooner than Nov. Documents needed to be sent over to Dr. Daniel Rojas office before sooner appointment could be given, patient is a new patient. Documents have been faxed.

## 2021-05-21 NOTE — TELEPHONE ENCOUNTER
Please call [facility]. She is a type I DM. Needs pump supplies and f/u (not complaint). See if someone (even a PAor NP) can see ehr sooner than Nov.  Thanks.

## 2021-05-28 ENCOUNTER — OFFICE VISIT (OUTPATIENT)
Dept: ENDOCRINOLOGY | Age: 26
End: 2021-05-28
Payer: OTHER GOVERNMENT

## 2021-05-28 VITALS
RESPIRATION RATE: 20 BRPM | HEART RATE: 114 BPM | DIASTOLIC BLOOD PRESSURE: 77 MMHG | WEIGHT: 195.4 LBS | BODY MASS INDEX: 31.4 KG/M2 | SYSTOLIC BLOOD PRESSURE: 108 MMHG | HEIGHT: 66 IN | OXYGEN SATURATION: 98 %

## 2021-05-28 DIAGNOSIS — E10.9 TYPE 1 DIABETES MELLITUS WITHOUT COMPLICATION (HCC): Primary | ICD-10-CM

## 2021-05-28 PROCEDURE — 99204 OFFICE O/P NEW MOD 45 MIN: CPT | Performed by: INTERNAL MEDICINE

## 2021-05-28 RX ORDER — BLOOD-GLUCOSE,RECEIVER,CONT
EACH MISCELLANEOUS
Qty: 1 EACH | Refills: 0 | Status: SHIPPED | OUTPATIENT
Start: 2021-05-28 | End: 2021-09-03 | Stop reason: SDUPTHER

## 2021-05-28 RX ORDER — BLOOD-GLUCOSE TRANSMITTER
EACH MISCELLANEOUS
Qty: 1 DEVICE | Refills: 0 | Status: SHIPPED | OUTPATIENT
Start: 2021-05-28 | End: 2021-09-03 | Stop reason: SDUPTHER

## 2021-05-28 RX ORDER — BLOOD-GLUCOSE SENSOR
EACH MISCELLANEOUS
Qty: 3 DEVICE | Refills: 11 | Status: SHIPPED | OUTPATIENT
Start: 2021-05-28 | End: 2021-09-03 | Stop reason: SDUPTHER

## 2021-05-28 RX ORDER — IBUPROFEN 200 MG
CAPSULE ORAL
Qty: 200 STRIP | Refills: 11 | Status: SHIPPED | OUTPATIENT
Start: 2021-05-28 | End: 2022-04-08 | Stop reason: ALTCHOICE

## 2021-05-28 RX ORDER — INSULIN ASPART 100 [IU]/ML
INJECTION, SOLUTION INTRAVENOUS; SUBCUTANEOUS
Qty: 9 VIAL | Refills: 3 | Status: SHIPPED | OUTPATIENT
Start: 2021-05-28 | End: 2021-09-03 | Stop reason: SDUPTHER

## 2021-05-28 RX ORDER — LANCETS
EACH MISCELLANEOUS
Qty: 200 EACH | Refills: 11 | Status: SHIPPED | OUTPATIENT
Start: 2021-05-28 | End: 2021-09-03 | Stop reason: SDUPTHER

## 2021-05-28 NOTE — PATIENT INSTRUCTIONS
Neris Tilley will teach you how to use the tandem t-slim x2 pump. Come back in Aug or Sept with labs before the visit. Bolus for meals !

## 2021-05-28 NOTE — PROGRESS NOTES
Chief Complaint   Patient presents with    New Patient    Diabetes     Pt would like to discuss starting a new CGM as well as new insulin pump       History of Present Illness: Jennyfer Mills is a 26 y.o. female with minimal past medical hx presenting in referral from John Mendoza MD for discussion related to medication management of diabetes mellitus. Has guardian and medtronic, found it to be very inaccurate. Last appointment with endo was 1 year ago. Has pens if pump runs out, no interest in pursuing fertility in the near future. Does forget to bolus with meals frequently. Diabetes Mellitus Type  I   * Diagnosed (year): 2012   * Last Hb A1C: Results for Reji Velasco (MRN [id number]) as of 2021 12:55   Ref.  Range 2021 11:00   Hemoglobin A1c, (calculated) Latest Ref Range: 4.8 - 5.6 % 10.3 (H)        - Current DM medications:    minimed 630  3236-6907: 1.35U/hr   0400- 1200 1.8 U/hr  1200- 1800 1.35 U/hr  8912-6128 1.45U/hr  9688-3903 1.80 U/hr  I:C ratio 1:9.5  SF 30  Target -125   TDD 53U -15.7U bolus 37.3U basal    AIT: 4hrs     - Monitors glucose: 4 times a day   Examples (range):    - fastin mg/dL    - pre-lunch: 263 mg/dL   - pre-dinner:  186 mg/dL   - post-prandial:  206 mg/dL    - History of hypoglycemia: 62     - Hospitalized for DM: age 22 -20 DKA     Diabetes-related complications:    * Nephropathy:  None   * Retinopathy: None- 2 months ago   * Neuropathy: none   * Autonomic dysfunction:  None   * History of amputations or foot ulcers:  None     Lifestyle:    24-hour diet history:    meals/day snacks/day   * Breakfast: Coffee    * Lunch: Corwith carrots snack like pop tart h20   * Dinner: 2 pieces of pizza   * Snacks:  Ice cream   * Desserts:    * Drinks:    2019QI  Exercise:       Support and Resources:   - Visit with dietician in the last 2 years: has not    Past Medical History:   Diagnosis Date    DM (diabetes mellitus), type 1 ([address] History reviewed. No pertinent surgical history. Current Outpatient Medications   Medication Sig    insulin aspart U-100 (NovoLOG U-100 Insulin aspart) 100 unit/mL injection To be used with medtronic insulin pump. Maximum daily dose 60 Units.  glucose blood VI test strips (blood glucose test) strip Check blood glucose levels four times daily. Diagnosis code E 11.65    lancets misc Check blood glucose levels four times daily. Diagnosis code E 11.65    Dexcom G6  misc Use as directed    Dexcom G6 Sensor jose Use as directed every 10 days    Dexcom G6 Transmitter jose Use as directed    Lantus Solostar U-100 Insulin 100 unit/mL (3 mL) inpn 45 Units by SubCUTAneous route as needed (for pump failure).  NovoLOG Flexpen U-100 Insulin 100 unit/mL (3 mL) inpn 10-16 Units by SubCUTAneous route Before breakfast, lunch, and dinner. For pump failure     No current facility-administered medications for this visit.        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54 kg/m²     - GENERAL: NCAT, Appears well nourished   - EYES: EOMI, non-icteric, no proptosis   - Ear/Nose/Throat: NCAT, no visible inflammation or masses   - CARDIOVASCULAR: no cyanosis, no visible JVD   - RESPIRATORY: respiratory effort normal without any distress or labored breathing   - MUSCULOSKELETAL: Normal ROM of neck and upper extremities observed   - SKIN: No rash on face  - NEUROLOGIC:   Monofilament test normal  - PSYCHIATRIC: Normal affect, Normal insight and judgement       Data Reviewed:   Results for Emerson Peres (MRN [id number]) as of 5/28/2021 12:55   Ref. Range 2/13/2021 11:00   Triglyceride Latest Ref Range: 0 - 149 mg/dL 201 (H)   Cholesterol, total Latest Ref Range: 100 - 199 mg/dL 229 (H)   HDL Cholesterol Latest Ref Range: >39 mg/dL 66   VLDL, calculated Latest Ref Range: 5 - 40 mg/dL 35   LDL, calculated Latest Ref Range: 0 - 99 mg/dL 128 (H)       Assessment/Plan: This is a very pleasant 52-year-old female with type 1 diabetes complicated by hyperglycemia seen in referral from Stephanie Aguilar MD for discussion related to switching from Medtronic to the Dexcom G6 tandem system. We discussed the importance of bolusing with all carbohydrates consumed. Placed a Dexcom G6 sample on her today and will move forward with obtaining a tandem T slim X 2 given the increased time in range associated with this pump when using control IQ. Sent a Dexcom clarity invitation such that she may share her data with me.     #DMI complicated by hyperglycemia  -Reviewed the importance of bolusing with all carbohydrates consumed  -Has basal and bolus pens available if the pump malfunctions  Current pump settings are as follows:   minimed 630   7321-9041: 1.35U/hr    0400- 1200 1.8 U/hr   1200- 1800 1.35 U/hr   4189-9464 1.45U/hr   5764-9819 1.80 U/hr   I:C ratio 1:9.5   SF 30   Target -125   TDD 53U -15.7U bolus 37.3U basal    AIT: 4hrs   This patient meets the criteria for dexcom G6 use by virtue of:  -Having been seen in our clinic within the past 6 months  -Having type 1 or 2 diabetes  -Performing frequent blood glucose monitoring testing (greater than or equal to 4x/day)  -Using an insulin pump OR taking greater than or equal to 3 daily injections of insulin  -Requiring frequent adjustments to their treatment regimen based on BGM or CGM testing results   - HEMOGLOBIN A1C WITH EAG; Future  - LIPID PANEL; Future  - METABOLIC PANEL, COMPREHENSIVE; Future  - MICROALBUMIN, UR, RAND W/ MICROALB/CREAT RATIO; Future  - REFERRAL TO DIABETIC EDUCATION; Standing  - Dexcom G6  misc; Use as directed  Dispense: 1 Each; Refill: 0  - Dexcom G6 Sensor jose; Use as directed every 10 days  Dispense: 3 Device; Refill: 11  - Dexcom G6 Transmitter jose; Use as directed  Dispense: 1 Device;  Refill: 0    Copy sent to:Roma Fontaine MD    Return to care: August or September 2022, visit with Ken Aguilar prior to our visit    Edwardo Barahona [facility]

## 2021-06-04 ENCOUNTER — TELEPHONE (OUTPATIENT)
Dept: ENDOCRINOLOGY | Age: 26
End: 2021-06-04

## 2021-06-04 NOTE — TELEPHONE ENCOUNTER
Spoke with Sienna Garcia and was made aware that the only test strips they have are the Freestyle precision and due to the pt never picking up strips at their pharmacy, she wasn't sure whether or not the pt is  using this particular meter. I then told her that I would have the pt to call to inform them of what strips she is need of and if they do not have what she is requesting, if they can instruct her of what to do. She agreed. I then called and left a message on pt's voicemail this information.

## 2021-06-04 NOTE — TELEPHONE ENCOUNTER
----- Message from Theodora Hadley sent at 6/4/2021  9:07 AM EDT -----  Regarding: /telephone  Caller's first and last name: Brinda Mcintosh with [address]     Reason for call: Clarity on test strips     Callback required yes/no and why:     Best contact number(s): [phone], option 8     Details to clarify the request: needs to know which test strip the pharmacist will be filling for the Pt

## 2021-08-02 DIAGNOSIS — E10.9 TYPE 1 DIABETES MELLITUS WITHOUT COMPLICATION (HCC): ICD-10-CM

## 2021-08-13 ENCOUNTER — TELEPHONE (OUTPATIENT)
Dept: ENDOCRINOLOGY | Age: 26
End: 2021-08-13

## 2021-08-13 NOTE — TELEPHONE ENCOUNTER
----- Message from [facility] sent at 8/13/2021  9:52 AM EDT -----  Regarding: /Telephone     Caller's first and last name:Replaced by [facility]  Reason for call:Clarisse states that she needs a CPT code in regards to the insulin pump.   Callback required yes/no and why:Yes  Best contact number(s):[phone]  Details to clarify the request:n/a

## 2021-08-13 NOTE — TELEPHONE ENCOUNTER
I spoke with Clarisse at pt's PCP's office and request that she send me the paperwork that she received regarding the pt's pump orders

## 2021-08-24 ENCOUNTER — TELEPHONE (OUTPATIENT)
Dept: INTERNAL MEDICINE CLINIC | Age: 26
End: 2021-08-24

## 2021-08-24 NOTE — TELEPHONE ENCOUNTER
----- Message from Roseanna Molina LPN sent at 0/71/3497 11:44 AM EDT -----  Regarding: FW: Prescription Question  Contact: [phone]    ----- Message -----  From: Yamilex Jones: 8/24/2021  11:35 AM EDT  To: Shearon Jeans Nurse Pool  Subject: Prescription Question                            Good morning,     I have been trying to get the Dexcom G6 continuous glucose monitor and have been running into some issues. Its been about 2 months since initially reaching out to the company ([facility]) and gaining some headway here and there, however, the company is now requesting a referral through my PCP to [facility] in order to approve my prescription. About 3 weeks ago I gave the company your contact information so they could reach out to you for a referral, so im not sure if you received a call from them or anything. So I am wondering if you may be of assistance with this matter so I can finally get my dexcom G6. Thank you and looking forward to hearing back.

## 2021-08-30 LAB
ALBUMIN SERPL-MCNC: 4.3 G/DL (ref 3.9–5)
ALBUMIN/CREAT UR: 14 MG/G CREAT (ref 0–29)
ALBUMIN/GLOB SERPL: 1.6 {RATIO} (ref 1.2–2.2)
ALP SERPL-CCNC: 71 IU/L (ref 48–121)
ALT SERPL-CCNC: 21 IU/L (ref 0–32)
AST SERPL-CCNC: 21 IU/L (ref 0–40)
BILIRUB SERPL-MCNC: 0.4 MG/DL (ref 0–1.2)
BUN SERPL-MCNC: 13 MG/DL (ref 6–20)
BUN/CREAT SERPL: 19 (ref 9–23)
CALCIUM SERPL-MCNC: 9.5 MG/DL (ref 8.7–10.2)
CHLORIDE SERPL-SCNC: 102 MMOL/L (ref 96–106)
CHOLEST SERPL-MCNC: 190 MG/DL (ref 100–199)
CO2 SERPL-SCNC: 24 MMOL/L (ref 20–29)
CREAT SERPL-MCNC: 0.69 MG/DL (ref 0.57–1)
CREAT UR-MCNC: 117.6 MG/DL
EST. AVERAGE GLUCOSE BLD GHB EST-MCNC: 246 MG/DL
GLOBULIN SER CALC-MCNC: 2.7 G/DL (ref 1.5–4.5)
GLUCOSE SERPL-MCNC: 107 MG/DL (ref 65–99)
HBA1C MFR BLD: 10.2 % (ref 4.8–5.6)
HDLC SERPL-MCNC: 73 MG/DL
LDLC SERPL CALC-MCNC: 105 MG/DL (ref 0–99)
MICROALBUMIN UR-MCNC: 16.2 UG/ML
POTASSIUM SERPL-SCNC: 4.1 MMOL/L (ref 3.5–5.2)
PROT SERPL-MCNC: 7 G/DL (ref 6–8.5)
SODIUM SERPL-SCNC: 141 MMOL/L (ref 134–144)
TRIGL SERPL-MCNC: 63 MG/DL (ref 0–149)
VLDLC SERPL CALC-MCNC: 12 MG/DL (ref 5–40)

## 2021-09-03 ENCOUNTER — OFFICE VISIT (OUTPATIENT)
Dept: ENDOCRINOLOGY | Age: 26
End: 2021-09-03
Payer: OTHER GOVERNMENT

## 2021-09-03 VITALS
RESPIRATION RATE: 16 BRPM | OXYGEN SATURATION: 97 % | BODY MASS INDEX: 31.96 KG/M2 | HEART RATE: 92 BPM | SYSTOLIC BLOOD PRESSURE: 105 MMHG | WEIGHT: 203.6 LBS | DIASTOLIC BLOOD PRESSURE: 74 MMHG | HEIGHT: 67 IN

## 2021-09-03 DIAGNOSIS — E10.9 TYPE 1 DIABETES MELLITUS WITHOUT COMPLICATION (HCC): ICD-10-CM

## 2021-09-03 PROCEDURE — 99214 OFFICE O/P EST MOD 30 MIN: CPT | Performed by: INTERNAL MEDICINE

## 2021-09-03 RX ORDER — BLOOD-GLUCOSE SENSOR
EACH MISCELLANEOUS
Qty: 3 EACH | Refills: 11 | Status: SHIPPED | OUTPATIENT
Start: 2021-09-03

## 2021-09-03 RX ORDER — INSULIN GLARGINE 100 [IU]/ML
45 INJECTION, SOLUTION SUBCUTANEOUS AS NEEDED
Qty: 5 PEN | Refills: 5 | Status: SHIPPED | OUTPATIENT
Start: 2021-09-03 | End: 2022-03-02 | Stop reason: SDUPTHER

## 2021-09-03 RX ORDER — INSULIN ASPART 100 [IU]/ML
INJECTION, SOLUTION INTRAVENOUS; SUBCUTANEOUS
Qty: 54 ML | Refills: 3 | Status: SHIPPED | OUTPATIENT
Start: 2021-09-03 | End: 2022-03-02 | Stop reason: ALTCHOICE

## 2021-09-03 RX ORDER — BLOOD-GLUCOSE,RECEIVER,CONT
EACH MISCELLANEOUS
Qty: 1 EACH | Refills: 3 | Status: SHIPPED | OUTPATIENT
Start: 2021-09-03

## 2021-09-03 RX ORDER — INSULIN ASPART 100 [IU]/ML
INJECTION, SOLUTION INTRAVENOUS; SUBCUTANEOUS
Qty: 5 PEN | Refills: 5 | Status: SHIPPED | OUTPATIENT
Start: 2021-09-03 | End: 2022-03-02 | Stop reason: SDUPTHER

## 2021-09-03 RX ORDER — IBUPROFEN 200 MG
CAPSULE ORAL
Qty: 100 STRIP | Refills: 11 | Status: SHIPPED | OUTPATIENT
Start: 2021-09-03

## 2021-09-03 RX ORDER — BLOOD-GLUCOSE TRANSMITTER
EACH MISCELLANEOUS
Qty: 3 EACH | Refills: 11 | Status: SHIPPED | OUTPATIENT
Start: 2021-09-03

## 2021-09-03 RX ORDER — GLUCAGON 1 MG
VIAL (EA) INJECTION
Qty: 1 EACH | Refills: 4 | Status: SHIPPED | OUTPATIENT
Start: 2021-09-03

## 2021-09-03 RX ORDER — INSULIN PUMP SYRINGE, 3 ML
EACH MISCELLANEOUS
Qty: 1 KIT | Refills: 0 | Status: SHIPPED | OUTPATIENT
Start: 2021-09-03

## 2021-09-03 RX ORDER — LANCETS
EACH MISCELLANEOUS
Qty: 200 EACH | Refills: 11 | Status: SHIPPED | OUTPATIENT
Start: 2021-09-03

## 2021-09-03 RX ORDER — GLUCAGON 3 MG/1
1 POWDER NASAL
Qty: 1 EACH | Refills: 0 | Status: SHIPPED | OUTPATIENT
Start: 2021-09-03 | End: 2021-09-03

## 2021-09-03 NOTE — PROGRESS NOTES
Chief Complaint   Patient presents with    Diabetes    Follow-up     History of Present Illness: Jhoana Jackson is a 26 y.o. female with minimal past medical hx presenting in follow up for discussion related to medication management of diabetes mellitus. Initial visit:  Has guardian and medtronic, found it to be very inaccurate. Last appointment with endo was 1 year ago. Has pens if pump runs out, no interest in pursuing fertility in the near future. Does forget to bolus with meals frequently. 2021: [facility] is getting dexcom at this point. Asked for a letter from PCP with a referral for [facility] medical. Notes lows at lunch time, active Yunior. Diabetes Mellitus Type  I   * Diagnosed (year): 2012   * Last Hb A1C: Results for Mendy Bryson (MRN [id number]) as of 2021 12:55   Ref. Range 2021 11:00   Hemoglobin A1c, (calculated) Latest Ref Range: 4.8 - 5.6 % 10.3 (H)     Results for Mendy Bryson (MRN [id number]) as of 9/3/2021 07:13   Ref. Range 2021 08:46   Hemoglobin A1c, (calculated) Latest Ref Range: 4.8 - 5.6 % 10.2 (H)        - Current DM medications:    minimed 630  0950-6521: 1.85U/hr   0400- 1200 1.8 U/hr  1200- 1800 1.35 U/hr  9332-1745 1.45U/hr  8700-1519 1.80 U/hr  I:C ratio 1:9.5  SF 30  Target -125   TDD 57.9   AIT: 4hrs     - Monitors glucose: 4 times a day   Examples (range):    - fastin mg/dL    - pre-lunch: 263 mg/dL   - pre-dinner:  186 mg/dL   - post-prandial:  206 mg/dL    - History of hypoglycemia: 61     - Hospitalized for DM: age 22 -20 DKA     Diabetes-related complications:    * Nephropathy:  None  Results for Mendy Bryson (MRN [id number]) as of 9/3/2021 07:13   Ref. Range 2021 08:46   Microalbumin/Creat.  Ratio Latest Ref Range: 0 - 29 mg/g creat 14      * Retinopathy: None- 2 months ago   * Neuropathy: none   * Autonomic dysfunction:  None   * History of amputations or foot ulcers:  None     Lifestyle: previously 24-hour diet history:    meals/day snacks/day   * Breakfast: Coffee    * Lunch: [place] carrots snack like pop tart h20   * Dinner: 2 pieces of pizza   * Snacks:  Ice cream   * Desserts:    * Drinks:    2019QI  Exercise: Active at lunch     Support and Resources:   - Visit with dietician in the last 2 years: has not      Current Outpatient Medications   Medication Sig    insulin aspart U-100 (NovoLOG U-100 Insulin aspart) 100 unit/mL injection To be used with medtronic insulin pump. Maximum daily dose 60 Units.  glucose blood VI test strips (blood glucose test) strip Check blood glucose levels four times daily. Diagnosis code E 11.65 (Patient taking differently: Check blood glucose levels four times daily. Diagnosis code E 11.65  Contour next)    NovoLOG Flexpen U-100 Insulin 100 unit/mL (3 mL) inpn 10-16 Units by SubCUTAneous route Before breakfast, lunch, and dinner. For pump failure    Lantus Solostar U-100 Insulin 100 unit/mL (3 mL) inpn 45 Units by SubCUTAneous route as needed (for pump failure).  lancets misc Check blood glucose levels four times daily. Diagnosis code E 11.65    Dexcom G6  misc Use as directed    Dexcom G6 Sensor jose Use as directed every 10 days (Patient not taking: Reported on 9/3/2021)    Dexcom G6 Transmitter jose Use as directed (Patient not taking: Reported on 9/3/2021)     No current facility-administered medications for this visit.        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    Review of Systems:  - Constitutional Symptoms: no fevers, chills, weight loss  - Eyes: no blurry vision or double vision  - Cardiovascular: no chest pain or palpitations  - Respiratory: no cough or shortness of breath  - Gastrointestinal: no dysphagia or abdominal pain  - Musculoskeletal: no joint pains or weakness  - Integumentary: no rashes  - Psychiatric: no depression or anxiety  - Endocrine: no heat or cold intolerance, no polyuria or polydipsia    Physical Examination:  Visit Vitals  /74   Pulse 92   Resp 16   Ht 5' 7\" (1.702 m)   Wt 203 lb 9.6 oz (92.4 kg)   SpO2 97%   BMI 31.89 kg/m²     - GENERAL: NCAT, Appears well nourished   - EYES: EOMI, non-icteric, no proptosis   - Ear/Nose/Throat: NCAT, no visible inflammation or masses   - CARDIOVASCULAR: no cyanosis, no visible JVD   - RESPIRATORY: respiratory effort normal without any distress or labored breathing   - MUSCULOSKELETAL: Normal ROM of neck and upper extremities observed   - SKIN: No rash on face  - NEUROLOGIC:   Monofilament test normal  - PSYCHIATRIC: Normal affect, Normal insight and judgement       Data Reviewed:   Results for Jany Dolan (MRN [id number]) as of 5/28/2021 12:55   Ref. Range 2/13/2021 11:00   Triglyceride Latest Ref Range: 0 - 149 mg/dL 201 (H)   Cholesterol, total Latest Ref Range: 100 - 199 mg/dL 229 (H)   HDL Cholesterol Latest Ref Range: >39 mg/dL 66   VLDL, calculated Latest Ref Range: 5 - 40 mg/dL 35   LDL, calculated Latest Ref Range: 0 - 99 mg/dL 128 (H)       Assessment/Plan: This is a very pleasant 44-year-old female with type 1 diabetes complicated by hyperglycemia seen in follow up for discussion related to switching from Medtronic to the Dexcom G6 tandem system. We discussed the importance of bolusing with all carbohydrates consumed. Placed a Dexcom G6 sample on her today and will move forward with obtaining a tandem T slim X 2 given the increased time in range associated with this pump when using control IQ. Sent a Dexcom clarity invitation such that she may share her data with me. Loosen insulin to carb ratio at lunch and SF at lunch.     #DMI complicated by hyperglycemia  -Reviewed the importance of bolusing with all carbohydrates consumed  -Has basal and bolus pens available if the pump malfunctions  Current pump settings are as follows:   minimed 630   3853-1170: 1.85U/hr    0400- 1200 1.80 U/hr   1200- 1800 1.35 U/hr   8236-6450 1.45U/hr   6753-6621 1.80 U/hr   I:C ratio 1:9.5 3481-1769   1200-16:30 1:12   16: 1:9.5   SF 30, 45 between 1200 and 16:30   Target -125   TDD 57.9U/hr    AIT: 4hrs     This patient meets the criteria for dexcom G6 use by virtue of:  -Having been seen in our clinic within the past 6 months  -Having type 1 or 2 diabetes  -Performing frequent blood glucose monitoring testing (greater than or equal to 4x/day)  -Using an insulin pump OR taking greater than or equal to 3 daily injections of insulin  -Requiring frequent adjustments to their treatment regimen based on BGM or CGM testing results     - HEMOGLOBIN A1C WITH EAG; Future  - LIPID PANEL; Future  - METABOLIC PANEL, COMPREHENSIVE; Future  - MICROALBUMIN, UR, RAND W/ MICROALB/CREAT RATIO; Future  - REFERRAL TO DIABETIC EDUCATION; Standing  - Dexcom G6  misc; Use as directed  Dispense: 1 Each; Refill: 0  - Dexcom G6 Sensor jose; Use as directed every 10 days  Dispense: 3 Device; Refill: 11  - Dexcom G6 Transmitter jose; Use as directed  Dispense: 1 Device;  Refill: 0    Copy sent to:Roma Fontaine MD    Return to care: March 2022    Zander Lyle, [facility] Endocr[facility]ology

## 2021-09-06 NOTE — LETTER
9/6/2021    [address] [facility] [address]      Dear Sylvain Manzano:    Please find your most recent results below. Resulted Orders   HEMOGLOBIN A1C WITH EAG   Result Value Ref Range    Hemoglobin A1c 10.2 (H) 4.8 - 5.6 %    Estimated average glucose 246 mg/dL    Narrative    Performed at:  [address]  [id number]  : Claudio Siddiqi MD, Phone:  [phone]   LIPID PANEL   Result Value Ref Range    Cholesterol, total 190 100 - 199 mg/dL    Triglyceride 63 0 - 149 mg/dL    HDL Cholesterol 73 >39 mg/dL    VLDL, calculated 12 5 - 40 mg/dL    LDL, calculated 105 (H) 0 - 99 mg/dL    Narrative    Performed at:  [address]  [id number]  : Claudio Siddiqi MD, Phone:  [phone]   METABOLIC PANEL, COMPREHENSIVE   Result Value Ref Range    Glucose 107 (H) 65 - 99 mg/dL    BUN 13 6 - 20 mg/dL    Creatinine 0.69 0.57 - 1.00 mg/dL    GFR est non- >59 mL/min/1.73    GFR est  >59 mL/min/1.73    BUN/Creatinine ratio 19 9 - 23    Sodium 141 134 - 144 mmol/L    Potassium 4.1 3.5 - 5.2 mmol/L    Chloride 102 96 - 106 mmol/L    CO2 24 20 - 29 mmol/L    Calcium 9.5 8.7 - 10.2 mg/dL    Protein, total 7.0 6.0 - 8.5 g/dL    Albumin 4.3 3.9 - 5.0 g/dL    GLOBULIN, TOTAL 2.7 1.5 - 4.5 g/dL    A-G Ratio 1.6 1.2 - 2.2    Bilirubin, total 0.4 0.0 - 1.2 mg/dL    Alk.  phosphatase 71 48 - 121 IU/L    AST (SGOT) 21 0 - 40 IU/L    ALT (SGPT) 21 0 - 32 IU/L    Narrative    Performed at:  [address]  [id number]  : Claudio Siddiqi MD, Phone:  [phone]   MICROALBUMIN, UR, RAND W/ MICROALB/CREAT RATIO   Result Value Ref Range    Creatinine, urine 117.6 Not Estab. mg/dL    Microalbumin, urine 16.2 Not Estab. ug/mL    Microalb/Creat ratio (ug/mg creat.) 14 0 - 29 mg/g creat    Narrative    Performed at:  01 - [facility] [address]  [id number]  : Osmin Jane MD, Phone:  [phone]       RECOMMENDATIONS:    Hey there Senia,    Your HbA1c is above our goal of 7.0% or less. Fortunately, there is no protein in your urine from the diabetes. Your cholesterol is a touch elevated, goal LDL level is <100. Please do update me in a few days when you've been wearing the dexcom and we can see what changes would be helpful in your pump settings before I go on maternity leave. Otherwise, bolus for all carbohydrates consumed!      Please call me if you have any questions: [phone]    Sincerely,      Alec Taylor MD

## 2021-09-08 ENCOUNTER — DOCUMENTATION ONLY (OUTPATIENT)
Dept: ENDOCRINOLOGY | Age: 26
End: 2021-09-08

## 2021-09-13 ENCOUNTER — TELEPHONE (OUTPATIENT)
Dept: ENDOCRINOLOGY | Age: 26
End: 2021-09-13

## 2021-09-13 NOTE — TELEPHONE ENCOUNTER
Alida Diaz from [facility] is calling about supplies for this patient. She states she spoke to you before about this patient and has already completed the PA however she is still receiving notification that it has not been completed. She says that you told her you would take care of it.

## 2021-09-14 NOTE — TELEPHONE ENCOUNTER
Spoke with Kai Molina to make hear aware that I sent every she had sent me to [address] [place] [address] but was recently told via fax that this was something that the PCP's had to submit. I  called [facility] medical this morning and spoke with [facility] and she too stated that the PCP has to submit the PA per pt's insurance. I then asked her for the hcpc codes and and she gave me  for the  and then  for the sensors. Today I called to share this information with Jing Kauffman but she was not in the office today, therefore, the information was given to [facility] and requested to have it shared with Jing Kauffman.

## 2021-09-15 ENCOUNTER — TELEPHONE (OUTPATIENT)
Dept: INTERNAL MEDICINE CLINIC | Age: 26
End: 2021-09-15

## 2021-09-15 DIAGNOSIS — E10.9 TYPE 1 DIABETES MELLITUS WITHOUT COMPLICATION (HCC): Primary | ICD-10-CM

## 2021-09-15 RX ORDER — BLOOD-GLUCOSE SENSOR
EACH MISCELLANEOUS
Qty: 12 EACH | Refills: 3 | Status: SHIPPED | OUTPATIENT
Start: 2021-09-15

## 2021-09-15 RX ORDER — BLOOD-GLUCOSE,RECEIVER,CONT
EACH MISCELLANEOUS
Qty: 1 EACH | Refills: 0 | Status: SHIPPED | OUTPATIENT
Start: 2021-09-15

## 2021-09-16 ENCOUNTER — DOCUMENTATION ONLY (OUTPATIENT)
Dept: INTERNAL MEDICINE CLINIC | Age: 26
End: 2021-09-16

## 2021-09-16 NOTE — PROGRESS NOTES
Called pt   2 pt identifiers   Spoke to pt   Advised pt that prescription for Glucose meter and sensors are ready for pickup   Pt shown understanding and had no further questions

## 2021-12-23 ENCOUNTER — TELEPHONE (OUTPATIENT)
Dept: ENDOCRINOLOGY | Age: 26
End: 2021-12-23

## 2021-12-23 NOTE — TELEPHONE ENCOUNTER
Received notification from the dexcom rep that the [facility] pharmacy has tried several times to reach her,  but they have had no success, and they would like to know whether or not she would like to move forward with processing the dexcom rx. Pt  stated that she is still interested however, her insurance will be changing, therefore, she will call around the first of the year.

## 2022-03-01 DIAGNOSIS — E10.9 TYPE 1 DIABETES MELLITUS WITHOUT COMPLICATION (HCC): ICD-10-CM

## 2022-03-02 RX ORDER — INSULIN ASPART 100 [IU]/ML
INJECTION, SOLUTION INTRAVENOUS; SUBCUTANEOUS
Qty: 45 ML | Refills: 2 | Status: SHIPPED
Start: 2022-03-02 | End: 2022-08-16 | Stop reason: ALTCHOICE

## 2022-03-02 RX ORDER — INSULIN GLARGINE 100 [IU]/ML
58 INJECTION, SOLUTION SUBCUTANEOUS EVERY 24 HOURS
Qty: 52.2 ML | Refills: 2 | Status: SHIPPED | OUTPATIENT
Start: 2022-03-02 | End: 2022-05-31

## 2022-03-25 LAB
ALBUMIN/CREAT UR: <7 MG/G CREAT (ref 0–29)
CHOLEST SERPL-MCNC: 252 MG/DL (ref 100–199)
CREAT UR-MCNC: 44.7 MG/DL
EST. AVERAGE GLUCOSE BLD GHB EST-MCNC: 289 MG/DL
HBA1C MFR BLD: 11.7 % (ref 4.8–5.6)
HDLC SERPL-MCNC: 68 MG/DL
LDLC SERPL CALC-MCNC: 148 MG/DL (ref 0–99)
MICROALBUMIN UR-MCNC: <3 UG/ML
TRIGL SERPL-MCNC: 201 MG/DL (ref 0–149)
VLDLC SERPL CALC-MCNC: 36 MG/DL (ref 5–40)

## 2022-04-08 ENCOUNTER — OFFICE VISIT (OUTPATIENT)
Dept: ENDOCRINOLOGY | Age: 27
End: 2022-04-08
Payer: OTHER GOVERNMENT

## 2022-04-08 VITALS
DIASTOLIC BLOOD PRESSURE: 75 MMHG | SYSTOLIC BLOOD PRESSURE: 106 MMHG | HEART RATE: 84 BPM | BODY MASS INDEX: 32.72 KG/M2 | WEIGHT: 208.5 LBS | RESPIRATION RATE: 16 BRPM | HEIGHT: 67 IN | OXYGEN SATURATION: 99 %

## 2022-04-08 DIAGNOSIS — E10.9 TYPE 1 DIABETES MELLITUS WITHOUT COMPLICATION (HCC): Primary | ICD-10-CM

## 2022-04-08 PROCEDURE — 95251 CONT GLUC MNTR ANALYSIS I&R: CPT | Performed by: INTERNAL MEDICINE

## 2022-04-08 PROCEDURE — 3046F HEMOGLOBIN A1C LEVEL >9.0%: CPT | Performed by: INTERNAL MEDICINE

## 2022-04-08 PROCEDURE — 99214 OFFICE O/P EST MOD 30 MIN: CPT | Performed by: INTERNAL MEDICINE

## 2022-04-08 RX ORDER — IBUPROFEN 200 MG
CAPSULE ORAL
Qty: 200 STRIP | Refills: 11 | Status: SHIPPED
Start: 2022-04-08 | End: 2022-07-15 | Stop reason: SDUPTHER

## 2022-04-08 RX ORDER — BLOOD-GLUCOSE TRANSMITTER
EACH MISCELLANEOUS
Qty: 3 EACH | Refills: 11 | Status: SHIPPED | OUTPATIENT
Start: 2022-04-08 | End: 2022-07-15 | Stop reason: SDUPTHER

## 2022-04-08 RX ORDER — BLOOD-GLUCOSE SENSOR
EACH MISCELLANEOUS
Qty: 9 EACH | Refills: 3 | Status: SHIPPED | OUTPATIENT
Start: 2022-04-08 | End: 2022-10-12 | Stop reason: SDUPTHER

## 2022-04-08 RX ORDER — BLOOD-GLUCOSE,RECEIVER,CONT
EACH MISCELLANEOUS
Qty: 1 EACH | Refills: 0 | Status: SHIPPED | OUTPATIENT
Start: 2022-04-08 | End: 2022-10-12 | Stop reason: SDUPTHER

## 2022-04-15 ENCOUNTER — PATIENT MESSAGE (OUTPATIENT)
Dept: ENDOCRINOLOGY | Age: 27
End: 2022-04-15

## 2022-04-20 NOTE — TELEPHONE ENCOUNTER
From: Ana Cristina Fink  Sent: 4/19/2022 10:23 PM EDT  To: [facility] Nurse Pool  Subject: Haritha Mancilla! I spoke with my pharmacist today and turns out they dont have the medication, which is why it wasnt listed lol! Would it be possible to send it to another pharmacy maybe?

## 2022-04-27 ENCOUNTER — TELEPHONE (OUTPATIENT)
Dept: ENDOCRINOLOGY | Age: 27
End: 2022-04-27

## 2022-04-27 NOTE — TELEPHONE ENCOUNTER
Ozempic, PA was denied currently waiting upon the denial details. Pt was made aware the PA denial via mychart.

## 2022-07-15 ENCOUNTER — OFFICE VISIT (OUTPATIENT)
Dept: ENDOCRINOLOGY | Age: 27
End: 2022-07-15
Payer: OTHER GOVERNMENT

## 2022-07-15 VITALS
HEART RATE: 86 BPM | DIASTOLIC BLOOD PRESSURE: 82 MMHG | HEIGHT: 67 IN | OXYGEN SATURATION: 99 % | RESPIRATION RATE: 16 BRPM | SYSTOLIC BLOOD PRESSURE: 122 MMHG | BODY MASS INDEX: 34.75 KG/M2 | WEIGHT: 221.4 LBS

## 2022-07-15 DIAGNOSIS — E10.9 TYPE 1 DIABETES MELLITUS WITHOUT COMPLICATION (HCC): Primary | ICD-10-CM

## 2022-07-15 LAB — HBA1C MFR BLD HPLC: 7.9 %

## 2022-07-15 PROCEDURE — 83036 HEMOGLOBIN GLYCOSYLATED A1C: CPT | Performed by: INTERNAL MEDICINE

## 2022-07-15 PROCEDURE — 95251 CONT GLUC MNTR ANALYSIS I&R: CPT | Performed by: INTERNAL MEDICINE

## 2022-07-15 PROCEDURE — 3051F HG A1C>EQUAL 7.0%<8.0%: CPT | Performed by: INTERNAL MEDICINE

## 2022-07-15 PROCEDURE — 99214 OFFICE O/P EST MOD 30 MIN: CPT | Performed by: INTERNAL MEDICINE

## 2022-07-15 NOTE — PROGRESS NOTES
Chief Complaint   Patient presents with    Diabetes    Follow-up     History of Present Illness: Franny Smallwood is a 23 y.o. female with minimal past medical hx presenting in follow up for discussion related to medication management of diabetes mellitus. Initial visit:  Has guardian and medtronic, found it to be very inaccurate. Last appointment with endo was 1 year ago. Has pens if pump runs out, no interest in pursuing fertility in the near future. Does forget to bolus with meals frequently. 09/03/2021: [facility] is getting dexcom at this point. Asked for a letter from PCP with a referral for [facility] medical. Notes lows at lunch time, active Yunior.     04/08/2022: Still does not have Dexcom, paid $700 cash for pump supplies. Was using basal bolus, fasting blood glucose was in the low 100s with 48 units of Lantus. Is hesitant to use more insulin than she is currently due to fears of weight gain. Had a low down to the 40s while at a friend's house. 07/15/2022: Ozempic was not approved, is wondering if we can appeal this. Increased basal rates, sometimes does miss dinner insulin or eat late. Noticed that the trendline for overnight is a steep slope. Very much looking forward to obtaining tandem pump. Working on propofol and TB med, synthesizing catalyst and working on PhD.    Diabetes Mellitus Type  I   * Diagnosed (year): March 2012   * Last Hb A1C: Results for Houston Goldthwaite (MRN [id number]) as of 5/28/2021 12:55   Ref. Range 2/13/2021 11:00   Hemoglobin A1c, (calculated) Latest Ref Range: 4.8 - 5.6 % 10.3 (H)     Results for Henny Goldthwaite (MRN [id number]) as of 9/3/2021 07:13   Ref. Range 8/27/2021 08:46   Hemoglobin A1c, (calculated) Latest Ref Range: 4.8 - 5.6 % 10.2 (H)     Results for Henny Goldthwaite (MRN [id number]) as of 4/8/2022 13:30   Ref.  Range 3/24/2022 11:41   Hemoglobin A1c, (calculated) Latest Ref Range: 4.8 - 5.6 % 11.7 (H)     07/15/2022:  7.9%     - Current DM medications:    See below    - Monitors glucose:10x          - History of hypoglycemia: 45s- <1%    - Hospitalized for DM: age 22 -20 DKA     Diabetes-related complications:    * Nephropathy:  None  Results for Oscar Suazo (MRN [id number]) as of 9/3/2021 07:13   Ref. Range 8/27/2021 08:46   Microalbumin/Creat. Ratio Latest Ref Range: 0 - 29 mg/g creat 14      * Retinopathy: None- 2 months ago 2022   * Neuropathy: none   * Autonomic dysfunction:  None   * History of amputations or foot ulcers:  None     Lifestyle: previously   24-hour diet history:    meals/day snacks/day   * Breakfast: Coffee    * Lunch: [place] carrots snack like pop tart h20   * Dinner: 2 pieces of pizza   * Snacks:  Ice cream   * Desserts:    * Drinks:    2019QI  Exercise: Active at lunch     Support and Resources:   - Visit with dietician in the last 2 years: has not      Current Outpatient Medications   Medication Sig    insulin pump (MiniMed 630G Insulin Pump) misc by SubCUTAneous route continuous.  NOVOLOG U-100 INSULIN ASPART SC by SubCUTAneous route. Use with pump    Dexcom G6  misc Use as directed    Dexcom G6 Sensor jose Use as directed every 10 days    NovoLOG Flexpen U-100 Insulin 100 unit/mL (3 mL) inpn Use as instructed three times daily with meals. Max daily dose 75 units. Dx code E10.71    Dexcom G6 Sensor jose Use as directed every 10 days    Dexcom G6 Transmitter jose Use as directed    lancets misc Check blood glucose levels four times daily. Diagnosis code E 10.65    glucagon (Glucagon Emergency Kit, human,) 1 mg solr Use as directed for hypOglycemia, dx code E10.65    Blood-Glucose Meter,Continuous (Dexcom ) 99 [id number] - use as directed. (Patient not taking: Reported on 4/8/2022)    Blood-Glucose Sensor (Dexcom G5-G4 Sensor) jose  - use as directed.  (Patient not taking: Reported on 4/8/2022)    Dexcom G6  misc Use as directed    Blood-Glucose Meter monitoring kit Test bg four times daily dx code E10.65    glucose blood VI test strips (blood glucose test) strip Dispense whatever brand is covered by insurance. Test four times daily. Dx code E10.65 (Patient not taking: Reported on 4/8/2022)     No current facility-administered medications for this visit. 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    Review of Systems:  - See HPI    Physical Examination:  Visit Vitals  /82   Pulse 86   Resp 16   Ht 5' 7\" (1.702 m)   Wt 221 lb 6.4 oz (100.4 kg)   SpO2 99%   BMI 34.68 kg/m²     - GENERAL: NCAT, Appears well nourished   - EYES: EOMI, non-icteric, no proptosis   - Ear/Nose/Throat: NCAT, no visible inflammation or masses   - CARDIOVASCULAR: no cyanosis, no visible JVD   - RESPIRATORY: respiratory effort normal without any distress or labored breathing   - MUSCULOSKELETAL: Normal ROM of neck and upper extremities observed   - SKIN: No rash on face  - NEUROLOGIC:   Monofilament test normal  - PSYCHIATRIC: Normal affect, Normal insight and judgement       Data Reviewed:   Results for Stephanie Mcguire (MRN [id number]) as of 5/28/2021 12:55   Ref. Range 2/13/2021 11:00   Triglyceride Latest Ref Range: 0 - 149 mg/dL 201 (H)   Cholesterol, total Latest Ref Range: 100 - 199 mg/dL 229 (H)   HDL Cholesterol Latest Ref Range: >39 mg/dL 66   VLDL, calculated Latest Ref Range: 5 - 40 mg/dL 35   LDL, calculated Latest Ref Range: 0 - 99 mg/dL 128 (H)       Assessment/Plan: This is a very pleasant 80-year-old female with type 1 diabetes complicated by hyperglycemia seen in follow up. Senia increased her basal rate overnight after reading about the indu phenomenon. We discussed the fact that the indu phenomenon usually happens around 6446-9032 and that most patients do not require higher rates overnight. We reviewed the idea that this likely is covering dinner to some extent, particularly when she does not bolus with dinner after forgetting.   Anticipate this basal rate would drop to 1.8 or 1.6U/hr if boluses with dinner are occurring every night and are estimated correctly. Tandem system would benefit Senia tremendously. Will initiate Ozempic given patient preference to avoid increases in insulin doses. Reviewed the risk of pancreatitis and nausea associated with this medication. Review of past several days of Dexcom data shows that this should work well to help with postprandial highs.   Over the past 14 days, she has been in range 49% of the time, high 26%, very high 24%, low 0%    #DMI complicated by hyperglycemia  -Initiate Ozempic 0.25 mg once weekly to limit insulin exposure  -48U basal providing excellent fasting BG  -Reviewed the importance of bolusing with all carbohydrates consumed  -Has basal and bolus pens available if the pump malfunctions  Current pump settings are as follows:   minimed 630   7830-5733: 2.0 U/hr -->recommend dropping to 1.8U/hr or 1.6U/hr especially if insulin is taken with dinner consistently   0400- 1200 1.80 U/hr   1200- 1800 1.85U/hr   9346-3547 1.85U/hr   1217-9928 1.90 U/hr   I:C ratio 1:9.5 5635-6017   1200-16:30 1:12   16: 1:9.5   SF 30, 45 between 1200 and 16:30   Target -125   AIT: 4hrs     This patient meets the criteria for dexcom G6 use by virtue of:  -Having been seen in our clinic within the past 6 months  -Having type 1 or 2 diabetes  -Performing frequent blood glucose monitoring testing (greater than or equal to 4x/day)  -Using an insulin pump OR taking greater than or equal to 3 daily injections of insulin  -Requiring frequent adjustments to their treatment regimen based on BGM or CGM testing results     -Microalbumin to creatinine ratio was normal in early 2022  -Is engaged with ophthalmology    Copy sent to:None    Return to care: 3 mo, will need to meet with pump - sent email to Opal Neville (on vacation) and Alfred Corral MD  [facility]

## 2022-08-10 ENCOUNTER — OFFICE VISIT (OUTPATIENT)
Dept: DIABETES SERVICES | Age: 27
End: 2022-08-10

## 2022-08-10 DIAGNOSIS — E10.9 TYPE 1 DIABETES MELLITUS WITHOUT COMPLICATION (HCC): ICD-10-CM

## 2022-08-11 DIAGNOSIS — E10.9 TYPE 1 DIABETES MELLITUS WITHOUT COMPLICATION (HCC): Primary | ICD-10-CM

## 2022-08-16 RX ORDER — INSULIN ASPART 100 [IU]/ML
INJECTION, SOLUTION INTRAVENOUS; SUBCUTANEOUS
Qty: 90 ML | Refills: 0 | Status: SHIPPED | OUTPATIENT
Start: 2022-08-16 | End: 2022-10-12 | Stop reason: SDUPTHER

## 2022-08-16 NOTE — PROGRESS NOTES
[employer] [facility]  Diabetes Self-Management Education & Support Program  Encounter Note    SUMMARY  Diabetes self-care management training was completed related to insulin pump therapy. Senia is an experienced pump patient (worn [place] and Medtronic systems since 2013) and arrived with her pump settings transferred from her Medtronic pump and in a current sensor session with her Dexcom. She has her Dexcom, Clarity and t:connet mobile set up with her system. EVALUATION:  Senia demonstrated confidence in navigation of her pump menus, programming of her pump and entering/exciting Control IQ. We discussed that once she has been training she will likely receive request to upgrade her pump for mobile bolus option but was also encouraged to complete this every time offered by the pump company to ensure she has all pump software updates. Due to her pump /basal settings, we merged time segment w/in her pump settings. RECOMMENDATIONS:  1) encouraged to review her history and TDD for total insulin use and update in Control IQ menu. 2) always opt for software updates when offered. 3) use glucometer when in sensor warm up for any insulin delivery. 4) adjust hypoglycemia treatment load and watch for trend arrow as BG values may not indicate change as timely or glucometer. 5) call if you have any questions. TOPICS DISCUSSED TODAY:  Medications and monitoring related to pump therapy. Next provider visit is scheduled for 10.12.22           DATE DSMES TOPIC EVALUATION   8/10/22 HOW CAN BLOOD GLUCOSE MONITORING HELP ME? Value of blood glucose monitoring   Realistic expectations   Differences between sensor and blood glucose values. Setting alerts on sensor for high/low/out of range  Using sensor glucose levels for treatment decisions.   Using glucometer for treatment decisions   Use of sensor trend arrows when dosing insulin   Sensor sites on body and relative to infusion sets  Tips for improving adhesion    Downloading and using smartphone apps  Data sharing with provider        The participant   Can demonstrate their sensor/glucometer procedure: Yes  Logs their BG readings:  Yes    The participant needs to address using glucometer readings for her warm up period. Discussed treatment of hypoglycemia - encouraged to use 4-8g versus rule of 15 when in Control IQ due possible autobolus. Instructed to deliver correctional boluses as requested by Control IQ per the algorithm and limit dosing. Instructed to contact  if she has any questions related to her sensor/pump system. Watch trend arrows to assess your hypoglycemia treatment efficacy. DATE DSMES TOPIC EVALUATION     8/10/22 HOW DO MY DIABETES MEDICATIONS WORK? Insulin pump therapy medications & methods   Insulin pump benefits and limitations  Understanding basal and bolus insulin  Navigating menus   Insulin pump settings  Infusion sets and site selection  Sensor and pump integration if applicable  Pump and smartphone apps  Troubleshooting hypo and hyperglycemia  Glucagon emergency kits  Medical procedures and warnings/warranty  Traveling with insulin pump and medications. Barriers to medication adherence. The participant   Can describe the expected action & side effects of prescribed diabetes medications: Yes  Can demonstrate injection technique (if applicable): Yes    Pt completed insulin pump training per Tandem guidelines for their t:slimx2 with Control IQ . Completed training checklist per pump company guidelines and demonstrated excellent understanding. Pt is using a infusion set with [employer]osoft 90 with 6 mm cannula with a fill amount: 0.3 units.  Settings program today are as follows for personal profiles:    Timed Settings Basal   unit/hour Correction Factor   1 unit: mg/dl Carb Ratio   1 unit: gram Target BG  1 unit: mg/dl   12:00 am  1.7  20  8  110    4:00 am  1.8  20   8  110    10:00 pm  1.7  20  8  110 TDD used for initiating Control IQ: 65 units  Sleep Activity schedule programmed: was not programmed due to her erratic sleep routine at this time. Instructed her to set Sleep Activity each night turning off upon waking. Reviewed using Exercise Activity with her gym routine for 30-60 minutes and continue to make adjustments to the start/stop times around activity to prevent post activity hyper/hypoglycemia. Senia is using Novolog in her insulin pump - reinforced 72 hour wear and adjust insulin cartridge load for this 3 day wear to optimize system response. Giulia Jones RD, [facility] on 8/16/2022 at 11:27 AM    I have personally reviewed the health record, including provider notes, laboratory data and current medications before making these care and education recommendations. Total minutes: 90 this visit covered by insulin pump company and is not a billable service. Karla Batista [employer] for Telehealth:  Frances Perry, was evaluated in person for training on her Tandem t:slim x2 with Control IQ.

## 2022-10-11 LAB
ALBUMIN/CREAT UR: 15 MG/G CREAT (ref 0–29)
CHOLEST SERPL-MCNC: 189 MG/DL (ref 100–199)
CREAT UR-MCNC: 158.8 MG/DL
EST. AVERAGE GLUCOSE BLD GHB EST-MCNC: 192 MG/DL
HBA1C MFR BLD: 8.3 % (ref 4.8–5.6)
HDLC SERPL-MCNC: 60 MG/DL
LDLC SERPL CALC-MCNC: 114 MG/DL (ref 0–99)
MICROALBUMIN UR-MCNC: 24.6 UG/ML
TRIGL SERPL-MCNC: 81 MG/DL (ref 0–149)
VLDLC SERPL CALC-MCNC: 15 MG/DL (ref 5–40)

## 2022-10-12 ENCOUNTER — OFFICE VISIT (OUTPATIENT)
Dept: ENDOCRINOLOGY | Age: 27
End: 2022-10-12
Payer: OTHER GOVERNMENT

## 2022-10-12 VITALS
SYSTOLIC BLOOD PRESSURE: 122 MMHG | HEART RATE: 97 BPM | HEIGHT: 67 IN | BODY MASS INDEX: 35.94 KG/M2 | RESPIRATION RATE: 16 BRPM | OXYGEN SATURATION: 99 % | DIASTOLIC BLOOD PRESSURE: 72 MMHG | WEIGHT: 229 LBS

## 2022-10-12 DIAGNOSIS — E10.9 TYPE 1 DIABETES MELLITUS WITHOUT COMPLICATION (HCC): Primary | ICD-10-CM

## 2022-10-12 DIAGNOSIS — Z46.81 INSULIN PUMP TRAINING: ICD-10-CM

## 2022-10-12 PROCEDURE — 3052F HG A1C>EQUAL 8.0%<EQUAL 9.0%: CPT | Performed by: INTERNAL MEDICINE

## 2022-10-12 PROCEDURE — 99214 OFFICE O/P EST MOD 30 MIN: CPT | Performed by: INTERNAL MEDICINE

## 2022-10-12 PROCEDURE — 95251 CONT GLUC MNTR ANALYSIS I&R: CPT | Performed by: INTERNAL MEDICINE

## 2022-10-12 RX ORDER — INSULIN ASPART 100 [IU]/ML
INJECTION, SOLUTION INTRAVENOUS; SUBCUTANEOUS
Qty: 100 ML | Refills: 0 | Status: SHIPPED | OUTPATIENT
Start: 2022-10-12

## 2022-10-12 RX ORDER — INSULIN ASPART 100 [IU]/ML
INJECTION, SOLUTION INTRAVENOUS; SUBCUTANEOUS
Qty: 45 ML | Refills: 1 | Status: SHIPPED | OUTPATIENT
Start: 2022-10-12

## 2022-10-12 RX ORDER — INSULIN GLARGINE 100 [IU]/ML
45 INJECTION, SOLUTION SUBCUTANEOUS EVERY 24 HOURS
Qty: 45 ML | Refills: 1 | Status: SHIPPED | OUTPATIENT
Start: 2022-10-12 | End: 2023-01-10

## 2022-10-12 NOTE — PROGRESS NOTES
Chief Complaint   Patient presents with    Follow-up    Diabetes     History of Present Illness: Porsha Domingo is a 23 y.o. female with minimal past medical hx presenting in follow up for discussion related to medication management of diabetes mellitus. Initial visit:  Has guardian and medtronic, found it to be very inaccurate. Last appointment with endo was 1 year ago. Has pens if pump runs out, no interest in pursuing fertility in the near future. Does forget to bolus with meals frequently. 09/03/2021: [facility] is getting dexcom at this point. Asked for a letter from PCP with a referral for [facility]. Notes lows at lunch time, active Yunior.     04/08/2022: Still does not have Dexcom, paid $700 cash for pump supplies. Was using basal bolus, fasting blood glucose was in the low 100s with 48 units of Lantus. Is hesitant to use more insulin than she is currently due to fears of weight gain. Had a low down to the 40s while at a friend's house. 07/15/2022: Ozempic was not approved, is wondering if we can appeal this. Increased basal rates, sometimes does miss dinner insulin or eat late. Noticed that the trendline for overnight is a steep slope. Very much looking forward to obtaining tandem pump. Working on propofol and TB med, synthesizing catalyst and working on PhD.    10/12/2022: Now on tandem t-slim x2- disconnected from dexcom for a month for a break. Insurance is changing. Diabetes Mellitus Type  I   * Diagnosed (year): March 2012   * Last Hb A1C: Lab Results   Component Value Date/Time    Hemoglobin A1c 8.3 (H) 10/10/2022 11:21 AM    Hemoglobin A1c (POC) 7.9 07/15/2022 09:55 AM       Results for Florestine Ear (MRN [id number]) as of 5/28/2021 12:55   Ref. Range 2/13/2021 11:00   Hemoglobin A1c, (calculated) Latest Ref Range: 4.8 - 5.6 % 10.3 (H)     Results for Florestine Ear (MRN [id number]) as of 9/3/2021 07:13   Ref.  Range 8/27/2021 08:46   Hemoglobin A1c, (calculated) Latest Ref Range: 4.8 - 5.6 % 10.2 (H)     Results for Rexene Medicine (MRN [id number]) as of 4/8/2022 13:30   Ref. Range 3/24/2022 11:41   Hemoglobin A1c, (calculated) Latest Ref Range: 4.8 - 5.6 % 11.7 (H)     07/15/2022:  7.9%     - Current DM medications:    See below    - Monitors glucose:10x        - History of hypoglycemia: 40s- <1%    - Hospitalized for DM: age 22 -20 DKA     Diabetes-related complications:    * Nephropathy:     Latest Reference Range & Units 10/10/22 11:21   Microalbumin/Creat. Ratio 0 - 29 mg/g creat 15     Results for Rexene Medicine (MRN [id number]) as of 9/3/2021 07:13   Ref. Range 8/27/2021 08:46   Microalbumin/Creat. Ratio Latest Ref Range: 0 - 29 mg/g creat 14      * Retinopathy: None- 2 months ago 2022   * Neuropathy: none   * Autonomic dysfunction:  None   * History of amputations or foot ulcers:  None     Lifestyle: previously   24-hour diet history:    meals/day snacks/day   * Breakfast: Coffee    * Lunch: [place] carrots snack like pop tart h20   * Dinner: 2 pieces of pizza   * Snacks:  Ice cream   * Desserts:    * Drinks:    2019QI  Exercise: Active at lunch     Support and Resources:   - Visit with dietician in the last 2 years: has not      Current Outpatient Medications   Medication Sig    NovoLOG U-100 Insulin aspart 100 unit/mL injection Use as directed with insulin pump. Max 100 units/day    insulin pump (PATIENT SUPPLIED) misc by SubCUTAneous route continuous. Dexcom G6 Sensor jose Use as directed every 10 days    Dexcom G6  misc Use as directed    Blood-Glucose Meter,Continuous (Dexcom ) misc  - use as directed. (Patient not taking: Reported on 4/8/2022)    Blood-Glucose Sensor (Dexcom G5-G4 Sensor) jose  - use as directed. (Patient not taking: No sig reported)    Dexcom G6 Transmitter jose Use as directed    lancets misc Check blood glucose levels four times daily.  Diagnosis code E 10.65 (Patient not taking: Reported on 10/12/2022)    Blood-Glucose Meter monitoring kit Test bg four times daily dx code E10.65    glucose blood VI test strips (blood glucose test) strip Dispense whatever brand is covered by insurance. Test four times daily. Dx code E10.65 (Patient not taking: No sig reported)    glucagon (Glucagon Emergency Kit, human,) 1 mg solr Use as directed for hypOglycemia, dx code E10.65     No current facility-administered medications for this visit. 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    Review of Systems:  See HPI    Physical Examination:  Visit Vitals  /72   Pulse 97   Resp 16   Ht 5' 6.75\" (1.695 m)   Wt 229 lb (103.9 kg)   SpO2 99%   BMI 36.14 kg/m²     - GENERAL: NCAT, Appears well nourished   - EYES: EOMI, non-icteric, no proptosis   - Ear/Nose/Throat: NCAT, no visible inflammation or masses   - CARDIOVASCULAR: no cyanosis, no visible JVD   - RESPIRATORY: respiratory effort normal without any distress or labored breathing   - MUSCULOSKELETAL: Normal ROM of neck and upper extremities observed   - SKIN: No rash on face  - NEUROLOGIC:   Monofilament test normal  - PSYCHIATRIC: Normal affect, Normal insight and judgement       Data Reviewed:    Latest Reference Range & Units 10/10/22 11:21   Triglyceride 0 - 149 mg/dL 81   Cholesterol, total 100 - 199 mg/dL 189   HDL Cholesterol >39 mg/dL 60   LDL, calculated 0 - 99 mg/dL 114 (H)   VLDL, calculated 5 - 40 mg/dL 15   (H): Data is abnormally high      Assessment/Plan: This is a very pleasant 60-year-old female with type 1 diabetes complicated by hyperglycemia seen in follow up. As of 10/2022 is now on tandem t-slim x2 with control IQ. Over the past 14 days, she has been in range 58% of the time, high 31%, very high 10%, low 0%. Highs are largely after meals- tighten insulin to carb ratios with breakfast and lunch. Doing really well as compared to initially.     #DMI complicated by hyperglycemia  -Has basal and bolus pens available if the pump malfunctions     Tandem t slim x2 with control IQ   0000 1.7 U/hr 1:8  SF 20 target 110   0400 1.8U/hr 1:8 SF 20 target 110  0830 1.80U/hr 1:8-->1.7 SF 20 target 110   1230 1.8U/hr 1:8--> 1:6 SF 20 target 110   1800 1.8U/hr 1:8 SF 20 target 110   2200 1.7U/hr 1:8 SF 20 target 110      This patient meets the criteria for dexcom G6 use by virtue of:  -Having been seen in our clinic within the past 6 months  -Having type 1 or 2 diabetes  -Performing frequent blood glucose monitoring testing (greater than or equal to 4x/day)  -Using an insulin pump OR taking greater than or equal to 3 daily injections of insulin  -Requiring frequent adjustments to their treatment regimen based on BGM or CGM testing results     -Microalbumin to creatinine ratio normal 10/2022  -Is engaged with ophthalmology    Copy sent to:None    Return to care: Feb    Edwardo Pratt [facility]

## 2022-11-10 ENCOUNTER — TELEPHONE (OUTPATIENT)
Dept: ENDOCRINOLOGY | Age: 27
End: 2022-11-10

## 2022-11-10 NOTE — TELEPHONE ENCOUNTER
Diabetes management and supplies called 11/10 @ 12:22 PM.    She stated they need the office notes for the patient for the last 6 months.     AGV#[phone]

## 2023-02-17 ENCOUNTER — OFFICE VISIT (OUTPATIENT)
Dept: ENDOCRINOLOGY | Age: 28
End: 2023-02-17
Payer: COMMERCIAL

## 2023-02-17 VITALS
OXYGEN SATURATION: 99 % | BODY MASS INDEX: 35.49 KG/M2 | HEART RATE: 87 BPM | RESPIRATION RATE: 20 BRPM | HEIGHT: 67 IN | DIASTOLIC BLOOD PRESSURE: 80 MMHG | SYSTOLIC BLOOD PRESSURE: 114 MMHG | WEIGHT: 226.1 LBS

## 2023-02-17 DIAGNOSIS — E10.9 TYPE 1 DIABETES MELLITUS WITHOUT COMPLICATION (HCC): ICD-10-CM

## 2023-02-17 RX ORDER — BLOOD-GLUCOSE TRANSMITTER
EACH MISCELLANEOUS
Qty: 3 EACH | Refills: 11 | Status: SHIPPED | OUTPATIENT
Start: 2023-02-17

## 2023-02-17 RX ORDER — BLOOD-GLUCOSE SENSOR
EACH MISCELLANEOUS
Qty: 3 EACH | Refills: 11 | Status: SHIPPED | OUTPATIENT
Start: 2023-02-17

## 2023-02-17 NOTE — PROGRESS NOTES
Chief Complaint   Patient presents with    Follow-up    Diabetes    Medication Evaluation     History of Present Illness: Anjana Favorite is a 33 y.o. female with minimal past medical hx presenting in follow up for discussion related to medication management of diabetes mellitus. Initial visit:  Has guardian and medtronic, found it to be very inaccurate. Last appointment with endo was 1 year ago. Has pens if pump runs out, no interest in pursuing fertility in the near future. Does forget to bolus with meals frequently. 09/03/2021: Focus is getting dexcom at this point. Asked for a letter from PCP with a referral for [facility]. Notes lows at lunch time, active Yunior.     04/08/2022: Still does not have Dexcom, paid $700 cash for pump supplies. Was using basal bolus, fasting blood glucose was in the low 100s with 48 units of Lantus. Is hesitant to use more insulin than she is currently due to fears of weight gain. Had a low down to the 40s while at a friend's house. 07/15/2022: Ozempic was not approved, is wondering if we can appeal this. Increased basal rates, sometimes does miss dinner insulin or eat late. Noticed that the trendline for overnight is a steep slope. Very much looking forward to obtaining tandem pump. Working on propofol and TB med, synthesizing catalyst and working on PhD.    10/12/2022: Now on tandem t-slim x2- disconnected from dexcom for a month for a break. Insurance is changing. 02/17/2023: Didn't have dexcom for a period- finds that she drops low due to autoboluses when missing for dinner meal. Wants to try to get ozempic again, wedding May 27th. No retinopathy on last assessment.     Diabetes Mellitus Type  I   * Diagnosed (year): March 2012   * Last Hb A1C: Lab Results   Component Value Date/Time    Hemoglobin A1c 9.3 (H) 02/15/2023 10:43 AM    Hemoglobin A1c (POC) 7.9 07/15/2022 09:55 AM     Results for Saud Rios (MRN [id number]) as of 5/28/2021 12:55   Ref. Range 2/13/2021 11:00   Hemoglobin A1c, (calculated) Latest Ref Range: 4.8 - 5.6 % 10.3 (H)     Results for Sheryl Simeon (MRN [id number]) as of 9/3/2021 07:13   Ref. Range 8/27/2021 08:46   Hemoglobin A1c, (calculated) Latest Ref Range: 4.8 - 5.6 % 10.2 (H)     Results for Sheryl Simeon (MRN [id number]) as of 4/8/2022 13:30   Ref. Range 3/24/2022 11:41   Hemoglobin A1c, (calculated) Latest Ref Range: 4.8 - 5.6 % 11.7 (H)     07/15/2022:  7.9%     - Current DM medications:    See below    - Monitors glucose:10x see scanned docs        - History of hypoglycemia: 40s- <1%    - Hospitalized for DM: age 22 -20 DKA     Diabetes-related complications:    * Nephropathy:     Latest Reference Range & Units 10/10/22 11:21   Microalbumin/Creat. Ratio 0 - 29 mg/g creat 15      Latest Reference Range & Units 2/15/23 10:43   Microalbumin/Creat. Ratio 0 - 29 mg/g creat 22     Results for Sheryl Simeon (MRN [id number]) as of 9/3/2021 07:13   Ref. Range 8/27/2021 08:46   Microalbumin/Creat. Ratio Latest Ref Range: 0 - 29 mg/g creat 14      * Retinopathy: none   * Neuropathy: none   * Autonomic dysfunction:  None   * History of amputations or foot ulcers:  None     Lifestyle: previously   24-hour diet history:    meals/day snacks/day   * Breakfast: Coffee    * Lunch: [place] carrots snack like pop tart h20   * Dinner: 2 pieces of pizza   * Snacks:  Ice cream   * Desserts:    * Drinks:    2019QI  Exercise: Active at lunch     Support and Resources:   - Visit with dietician in the last 2 years: has not      Current Outpatient Medications   Medication Sig    NovoLOG U-100 Insulin aspart 100 unit/mL injection Use as directed with insulin pump. Max 100 units/day    insulin aspart U-100 (NOVOLOG) 100 unit/mL (3 mL) inpn Take 1 unit of insulin for every    insulin pump (PATIENT SUPPLIED) misc by SubCUTAneous route continuous.     glucagon (Glucagon Emergency Kit, human,) 1 mg solr Use as directed for hypOglycemia, dx code E10.65    Blood-Glucose Meter,Continuous (Dexcom ) misc  - use as directed. (Patient not taking: No sig reported)    Blood-Glucose Sensor (Dexcom G5-G4 Sensor) jose  - use as directed. (Patient not taking: No sig reported)    Dexcom G6 Sensor jose Use as directed every 10 days (Patient not taking: Reported on 2/17/2023)    Dexcom G6 Transmitter jose Use as directed    Dexcom G6  misc Use as directed (Patient not taking: Reported on 2/17/2023)    lancets misc Check blood glucose levels four times daily. Diagnosis code E 10.65 (Patient not taking: Reported on 10/12/2022)    Blood-Glucose Meter monitoring kit Test bg four times daily dx code E10.65    glucose blood VI test strips (blood glucose test) strip Dispense whatever brand is covered by insurance. Test four times daily. Dx code E10.65 (Patient not taking: No sig reported)     No current facility-administered medications for this visit.        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  Getting  May 27th, 2022    Review of Systems:  See HPI    Physical Examination:  Visit Vitals  /80   Pulse 87   Resp 20   Ht 5' 6.75\" (1.695 m)   Wt 226 lb 1.6 oz (102.6 kg)   SpO2 99%   BMI 35.68 kg/m²     - GENERAL: NCAT, Appears well nourished   - EYES: EOMI, non-icteric, no proptosis   - Ear/Nose/Throat: NCAT, no visible inflammation or masses   - CARDIOVASCULAR: no cyanosis, no visible JVD   - RESPIRATORY: respiratory effort normal without any distress or labored breathing   - MUSCULOSKELETAL: Normal ROM of neck and upper extremities observed   - SKIN: No rash on face  - NEUROLOGIC:   Monofilament test normal  - PSYCHIATRIC: Normal affect, Normal insight and judgement       Data Reviewed:    Latest Reference Range & Units 10/10/22 11:21   Triglyceride 0 - 149 mg/dL 81   Cholesterol, total 100 - 199 mg/dL 189   HDL Cholesterol >39 mg/dL 60   LDL, calculated 0 - 99 mg/dL 114 (H)   VLDL, calculated 5 - 40 mg/dL 15   (H): Data is abnormally high      Assessment/Plan: This is a very pleasant 60-year-old female with type 1 diabetes complicated by hyperglycemia seen in follow up. As of 10/2022 is now on tandem t-slim x2 with control IQ. Over the past 14 days, she has been in range 68% of the time, high 32%, very high 0%, low 0%. Tighten insulin to carb with dinner based on TDD 67U, do not miss this- loosen SF overnight to 35.     #DMI complicated by hyperglycemia  -initiate and up-titrate ozempic- only do this when on dexcom, loosen basal rates by 20%  -Has basal and bolus pens available if the pump malfunctions     Tandem t slim x2 with control IQ   0000 1.7 U/hr 1:8  SF 20 target 110   0400 1.8U/hr 1:8 SF 20 target 110  0830 1.80U/hr 1:7 SF 20 target 110   1230 1.8U/hr 1:6 SF 20 target 110   1800 1.8U/hr 1:8-->1:7 SF 20 target 110   2200 1.7U/hr 1:8 SF 20-->35 target 110      This patient meets the criteria for dexcom G6 use by virtue of:  -Having been seen in our clinic within the past 6 months  -Having type 1 or 2 diabetes  -Performing frequent blood glucose monitoring testing (greater than or equal to 4x/day)  -Using an insulin pump OR taking greater than or equal to 3 daily injections of insulin  -Requiring frequent adjustments to their treatment regimen based on BGM or CGM testing results     -Microalbumin to creatinine ratio normal 10/2022  -Is engaged with ophthalmology    Copy sent to:None    Return to care: Sandieb    Vicki Pires, [place] [facility]
